# Patient Record
Sex: MALE | Race: WHITE | Employment: FULL TIME | URBAN - METROPOLITAN AREA
[De-identification: names, ages, dates, MRNs, and addresses within clinical notes are randomized per-mention and may not be internally consistent; named-entity substitution may affect disease eponyms.]

---

## 2023-03-24 ENCOUNTER — TELEPHONE (OUTPATIENT)
Dept: PRIMARY CARE | Facility: CLINIC | Age: 48
End: 2023-03-24
Payer: COMMERCIAL

## 2023-03-24 DIAGNOSIS — F98.8 ATTENTION DEFICIT DISORDER (ADD) WITHOUT HYPERACTIVITY: Primary | ICD-10-CM

## 2023-03-24 RX ORDER — NAPROXEN 500 MG/1
1 TABLET ORAL EVERY 12 HOURS PRN
COMMUNITY
Start: 2022-02-16 | End: 2024-05-24 | Stop reason: ALTCHOICE

## 2023-03-24 RX ORDER — DEXTROAMPHETAMINE SACCHARATE, AMPHETAMINE ASPARTATE, DEXTROAMPHETAMINE SULFATE AND AMPHETAMINE SULFATE 7.5; 7.5; 7.5; 7.5 MG/1; MG/1; MG/1; MG/1
1 TABLET ORAL DAILY
COMMUNITY
Start: 2015-02-09 | End: 2023-03-24 | Stop reason: SDUPTHER

## 2023-03-24 RX ORDER — DEXTROAMPHETAMINE SACCHARATE, AMPHETAMINE ASPARTATE, DEXTROAMPHETAMINE SULFATE AND AMPHETAMINE SULFATE 7.5; 7.5; 7.5; 7.5 MG/1; MG/1; MG/1; MG/1
1 TABLET ORAL DAILY
Qty: 90 TABLET | Refills: 0 | Status: SHIPPED | OUTPATIENT
Start: 2023-03-24 | End: 2023-07-17 | Stop reason: SDUPTHER

## 2023-03-24 RX ORDER — CYCLOBENZAPRINE HCL 10 MG
1 TABLET ORAL 2 TIMES DAILY PRN
COMMUNITY
Start: 2022-02-15 | End: 2024-05-24 | Stop reason: ALTCHOICE

## 2023-03-24 RX ORDER — CITALOPRAM 40 MG/1
1 TABLET, FILM COATED ORAL DAILY
COMMUNITY
Start: 2013-11-20 | End: 2023-04-17

## 2023-04-15 DIAGNOSIS — F43.20 ADJUSTMENT DISORDER, UNSPECIFIED TYPE: Primary | ICD-10-CM

## 2023-04-17 RX ORDER — CITALOPRAM 40 MG/1
TABLET, FILM COATED ORAL
Qty: 90 TABLET | Refills: 3 | Status: SHIPPED | OUTPATIENT
Start: 2023-04-17 | End: 2023-09-15 | Stop reason: SDUPTHER

## 2023-07-17 ENCOUNTER — TELEPHONE (OUTPATIENT)
Dept: PRIMARY CARE | Facility: CLINIC | Age: 48
End: 2023-07-17
Payer: COMMERCIAL

## 2023-07-17 DIAGNOSIS — F98.8 ATTENTION DEFICIT DISORDER (ADD) WITHOUT HYPERACTIVITY: ICD-10-CM

## 2023-07-17 PROBLEM — R61 DIAPHORESIS: Status: ACTIVE | Noted: 2023-07-17

## 2023-07-17 PROBLEM — K42.9 UMBILICAL HERNIA: Status: ACTIVE | Noted: 2023-07-17

## 2023-07-17 PROBLEM — U07.1 COVID-19: Status: ACTIVE | Noted: 2023-07-17

## 2023-07-17 PROBLEM — S39.012A LUMBAR STRAIN: Status: ACTIVE | Noted: 2023-07-17

## 2023-07-17 PROBLEM — R79.89 LOW TESTOSTERONE: Status: ACTIVE | Noted: 2023-07-17

## 2023-07-17 PROBLEM — S20.212D RIB CONTUSION, LEFT, SUBSEQUENT ENCOUNTER: Status: ACTIVE | Noted: 2023-07-17

## 2023-07-17 PROBLEM — N28.9 RENAL INSUFFICIENCY: Status: ACTIVE | Noted: 2023-07-17

## 2023-07-17 PROBLEM — R07.89 CHEST DISCOMFORT: Status: ACTIVE | Noted: 2023-07-17

## 2023-07-17 PROBLEM — B34.9 VIRAL INFECTION: Status: ACTIVE | Noted: 2023-07-17
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
## 2023-07-17 PROBLEM — G47.00 INSOMNIA: Status: ACTIVE | Noted: 2023-07-17

## 2023-07-17 PROBLEM — R06.02 SOB (SHORTNESS OF BREATH): Status: ACTIVE | Noted: 2023-07-17

## 2023-07-17 PROBLEM — R05.3 PERSISTENT COUGH: Status: ACTIVE | Noted: 2023-07-17

## 2023-07-17 PROBLEM — R51.9 HEADACHE: Status: ACTIVE | Noted: 2023-07-17

## 2023-07-17 PROBLEM — R79.89 ELEVATED LIVER FUNCTION TESTS: Status: ACTIVE | Noted: 2023-07-17

## 2023-07-17 PROBLEM — F32.A DEPRESSION: Status: ACTIVE | Noted: 2023-07-17

## 2023-07-17 PROBLEM — T14.90XA TRAUMA: Status: ACTIVE | Noted: 2023-07-17

## 2023-07-17 PROBLEM — L73.9 FOLLICULITIS: Status: ACTIVE | Noted: 2023-07-17

## 2023-07-17 PROBLEM — L25.9 CONTACT DERMATITIS: Status: ACTIVE | Noted: 2023-07-17

## 2023-07-17 PROBLEM — R50.9 FEVER: Status: ACTIVE | Noted: 2023-07-17

## 2023-07-17 RX ORDER — DEXTROAMPHETAMINE SACCHARATE, AMPHETAMINE ASPARTATE, DEXTROAMPHETAMINE SULFATE AND AMPHETAMINE SULFATE 7.5; 7.5; 7.5; 7.5 MG/1; MG/1; MG/1; MG/1
1 TABLET ORAL DAILY
Qty: 90 TABLET | Refills: 0 | Status: SHIPPED | OUTPATIENT
Start: 2023-07-17 | End: 2023-08-29 | Stop reason: SDUPTHER

## 2023-08-28 ENCOUNTER — TELEPHONE (OUTPATIENT)
Dept: PRIMARY CARE | Facility: CLINIC | Age: 48
End: 2023-08-28
Payer: COMMERCIAL

## 2023-08-28 DIAGNOSIS — F98.8 ATTENTION DEFICIT DISORDER (ADD) WITHOUT HYPERACTIVITY: ICD-10-CM

## 2023-08-28 RX ORDER — DEXTROAMPHETAMINE SACCHARATE, AMPHETAMINE ASPARTATE, DEXTROAMPHETAMINE SULFATE AND AMPHETAMINE SULFATE 7.5; 7.5; 7.5; 7.5 MG/1; MG/1; MG/1; MG/1
1 TABLET ORAL DAILY
Qty: 30 TABLET | Refills: 0 | Status: CANCELLED | OUTPATIENT
Start: 2023-08-28

## 2023-08-28 RX ORDER — DICLOFENAC SODIUM 50 MG/1
50 TABLET, DELAYED RELEASE ORAL 2 TIMES DAILY
COMMUNITY
Start: 2023-08-13 | End: 2023-08-27

## 2023-08-28 NOTE — TELEPHONE ENCOUNTER
Pt needs a refill he had a request for a 90 day supply insurance only covered 30 day supply. amphetamine-dextroamphetamine (Adderall) 30 mg tablet  x 1 tablet daily please refill for 30 days     [facility] [id number] [facility]

## 2023-08-29 DIAGNOSIS — F98.8 ATTENTION DEFICIT DISORDER (ADD) WITHOUT HYPERACTIVITY: ICD-10-CM

## 2023-08-29 RX ORDER — DEXTROAMPHETAMINE SACCHARATE, AMPHETAMINE ASPARTATE, DEXTROAMPHETAMINE SULFATE AND AMPHETAMINE SULFATE 7.5; 7.5; 7.5; 7.5 MG/1; MG/1; MG/1; MG/1
1 TABLET ORAL DAILY
Qty: 20 TABLET | Refills: 0 | Status: SHIPPED | OUTPATIENT
Start: 2023-08-29 | End: 2023-09-15 | Stop reason: SDUPTHER

## 2023-08-29 NOTE — TELEPHONE ENCOUNTER
Pt scheduled appt. For 9/15.  Pt works in [place] and he comes home every other weekend and is asking if a partial refill can be called into pharmacy to get him by.     [facility] # [phone]  [place] # [phone]

## 2023-09-15 ENCOUNTER — TELEPHONE (OUTPATIENT)
Dept: PRIMARY CARE | Facility: CLINIC | Age: 48
End: 2023-09-15

## 2023-09-15 ENCOUNTER — OFFICE VISIT (OUTPATIENT)
Dept: PRIMARY CARE | Facility: CLINIC | Age: 48
End: 2023-09-15
Payer: COMMERCIAL

## 2023-09-15 VITALS
HEART RATE: 68 BPM | TEMPERATURE: 97.4 F | OXYGEN SATURATION: 97 % | HEIGHT: 72 IN | SYSTOLIC BLOOD PRESSURE: 126 MMHG | BODY MASS INDEX: 28.58 KG/M2 | WEIGHT: 211 LBS | DIASTOLIC BLOOD PRESSURE: 76 MMHG

## 2023-09-15 DIAGNOSIS — Z00.00 HEALTH MAINTENANCE EXAMINATION: ICD-10-CM

## 2023-09-15 DIAGNOSIS — Z79.899 ENCOUNTER FOR LONG-TERM CURRENT USE OF MEDICATION: ICD-10-CM

## 2023-09-15 DIAGNOSIS — F43.20 ADJUSTMENT DISORDER, UNSPECIFIED TYPE: ICD-10-CM

## 2023-09-15 DIAGNOSIS — F98.8 ATTENTION DEFICIT DISORDER (ADD) WITHOUT HYPERACTIVITY: ICD-10-CM

## 2023-09-15 PROCEDURE — 99213 OFFICE O/P EST LOW 20 MIN: CPT | Performed by: FAMILY MEDICINE

## 2023-09-15 PROCEDURE — 80324 DRUG SCREEN AMPHETAMINES 1/2: CPT

## 2023-09-15 PROCEDURE — 80307 DRUG TEST PRSMV CHEM ANLYZR: CPT

## 2023-09-15 RX ORDER — CITALOPRAM 40 MG/1
40 TABLET, FILM COATED ORAL DAILY
Qty: 90 TABLET | Refills: 3 | Status: SHIPPED | OUTPATIENT
Start: 2023-09-15

## 2023-09-15 RX ORDER — DEXTROAMPHETAMINE SACCHARATE, AMPHETAMINE ASPARTATE, DEXTROAMPHETAMINE SULFATE AND AMPHETAMINE SULFATE 7.5; 7.5; 7.5; 7.5 MG/1; MG/1; MG/1; MG/1
1 TABLET ORAL DAILY
Qty: 90 TABLET | Refills: 0 | Status: SHIPPED | OUTPATIENT
Start: 2023-09-15 | End: 2023-12-28 | Stop reason: SDUPTHER

## 2023-09-15 ASSESSMENT — ENCOUNTER SYMPTOMS
DECREASED CONCENTRATION: 0
LOSS OF SENSATION IN FEET: 0
EYES NEGATIVE: 1
CONSTITUTIONAL NEGATIVE: 1
HYPERACTIVE: 0
OCCASIONAL FEELINGS OF UNSTEADINESS: 0
HALLUCINATIONS: 0
RESPIRATORY NEGATIVE: 1
DEPRESSION: 0
PSYCHIATRIC NEGATIVE: 1

## 2023-09-15 ASSESSMENT — PATIENT HEALTH QUESTIONNAIRE - PHQ9
2. FEELING DOWN, DEPRESSED OR HOPELESS: NOT AT ALL
1. LITTLE INTEREST OR PLEASURE IN DOING THINGS: NOT AT ALL
10. IF YOU CHECKED OFF ANY PROBLEMS, HOW DIFFICULT HAVE THESE PROBLEMS MADE IT FOR YOU TO DO YOUR WORK, TAKE CARE OF THINGS AT HOME, OR GET ALONG WITH OTHER PEOPLE: NOT DIFFICULT AT ALL
SUM OF ALL RESPONSES TO PHQ9 QUESTIONS 1 AND 2: 0

## 2023-09-15 NOTE — PROGRESS NOTES
OARRS:  No data recorded  I have personally reviewed the OARRS report for Maksim Siu. I have considered the risks of abuse, dependence, addiction and diversion    Is the patient prescribed a combination of a benzodiazepine and opioid?  No    Last Urine Drug Screen / ordered today: Yes  No results found for this or any previous visit (from the past 8760 hour(s)).  N/A  Clinical rationale for not completing a Urine Drug Screen:  ordered    Controlled Substance Agreement:  Date of the Last Agreement: 9/15/2023  Reviewed Controlled Substance Agreement including but not limited to the benefits, risks, and alternatives to treatment with a Controlled Substance medication(s).    Stimulants:   What is the patient's goal of therapy? Focus well  Is this being achieved with current treatment? yes    Activities of Daily Living:   Is your overall impression that this patient is benefiting (symptom reduction outweighs side effects) from stimulant therapy? Yes     1. Physical Functioning: Better  2. Family Relationship: Same  3. Social Relationship: Same  4. Mood: Better  5. Sleep Patterns: Same  6. Overall Function: Better  Subjective   Patient ID: Maksim Siu is a 48 y.o. male who presents for Med Refill (Adderall).    Patient presents for follow-up.  Doing well with medicine.  He is focusing well is working well.  Has had no side effects no chest pain no shortness of breath no rapid heart rate or slow heart rate.  Skin to come in for physical exam in the next couple of months.  Also needs refill on citalopram.  Has been doing well with this as well.    Med Refill         Review of Systems   Constitutional: Negative.    HENT: Negative.     Eyes: Negative.    Respiratory: Negative.     Genitourinary: Negative.    Psychiatric/Behavioral: Negative.  Negative for decreased concentration and hallucinations. The patient is not hyperactive.        Objective   /76   Pulse 68   Temp 36.3 °C (97.4 °F)   Ht 1.816 m (5'  "11.5\")   Wt 95.7 kg (211 lb)   SpO2 97%   BMI 29.02 kg/m²   BSA Body surface area is 2.2 meters squared.      Physical Exam  HENT:      Head: Normocephalic.      Right Ear: Tympanic membrane normal.      Left Ear: Tympanic membrane normal.      Nose: Nose normal.      Mouth/Throat:      Mouth: Mucous membranes are moist.   Eyes:      Pupils: Pupils are equal, round, and reactive to light.   Cardiovascular:      Pulses: Normal pulses.   Pulmonary:      Effort: Pulmonary effort is normal.   Abdominal:      General: Abdomen is flat.   Musculoskeletal:         General: No deformity.   Neurological:      Mental Status: He is alert.   Psychiatric:         Mood and Affect: Mood normal.         Behavior: Behavior normal.       No visits with results within 1 Year(s) from this visit.   Latest known visit with results is:   Legacy Encounter on 02/04/2021   Component Date Value Ref Range Status    SARS-CoV-2 Result 02/04/2021 NOT DETECTED  Not Detected Final    Comment: .  This assay is designed to detect the N, ORF1ab and/or S genes of SARS-CoV-2   via nucleic acid amplification.  A Negative (NOT DETECTED) result does not   preclude 2019-nCoV infection since the adequacy of sample collection and/or   low viral burden may result in presence of viral nucleic acids below the   clinical sensitivity of this test method.  Negative (NOT DETECTED) result   should not be used as the sole basis for treatment or other patient   management decisions. Rather negative results should be combined with   clinical observations, patient history, and epidemiological information to   make patient management decisions.    Fact sheet for providers: https://www.fda.gov/media/536425/download  Fact sheet for patients: https://www.fda.gov/media/553984/download    This test has received FDA Emergency Use Authorization (EUA) and has been   verified by [facility] ([facility]). This test   is only authorized for the " duration of time that circumstances                            exist to   justify the authorization of the emergency use of in vitro diagnostic tests   for the detection of SARS-CoV-2 virus and/or diagnosis of COVID-19 infection   under section 564(b)(1) of the Act, 21 U.S.C. 360bbb-3(b)(1), unless the   authorization is terminated or revoked sooner.      [facility] is certified under CLIA-88 as   qualified to perform high complexity testing. Testing is performed in the   [facility] laboratories located at [address] [place] [address].      Date of Symptom Onset? (YYYYMMDD)? 02/04/2021 20,210,202   Final     Current Outpatient Medications on File Prior to Visit   Medication Sig Dispense Refill    amphetamine-dextroamphetamine (Adderall) 30 mg tablet Take 1 tablet (30 mg) by mouth once daily. 20 tablet 0    citalopram (CeleXA) 40 mg tablet TAKE ONE TABLET BY MOUTH ONCE DAILY. 90 tablet 3    cyclobenzaprine (Flexeril) 10 mg tablet Take 1 tablet (10 mg) by mouth 2 times a day as needed.      naproxen (Naprosyn) 500 mg tablet Take 1 tablet (500 mg) by mouth every 12 hours if needed.       No current facility-administered medications on file prior to visit.     No images are attached to the encounter.            Assessment/Plan   Problem List Items Addressed This Visit       Attention deficit disorder (ADD) without hyperactivity    Relevant Medications    amphetamine-dextroamphetamine (Adderall) 30 mg tablet    Adjustment disorder    Relevant Medications    citalopram (CeleXA) 40 mg tablet

## 2023-09-15 NOTE — PATIENT INSTRUCTIONS
Overall doing well with present medical regimen.    Please try to take time out to exercise 40 minutes 4 times weekly.    Please try to watch dietary intake and try to eat more healthy.    Please schedule time for the physical given slip for your lab studies today.    Get pain I have personally reviewed the OAARS report for the patient. The report is scanned into the electronic medical record. I have considered the risks of abuse, dependence, addiction, and diversion. I believe that it is clinically appropriate for the patient to be prescribed this medication.    We discussed the increased risks of respiratory depression, including but not limited to death when combining opioid, benzodiazepines, and or sleep aids.

## 2023-09-16 LAB
AMPHETAMINE (PRESENCE) IN URINE BY SCREEN METHOD: ABNORMAL
BARBITURATES PRESENCE IN URINE BY SCREEN METHOD: ABNORMAL
BENZODIAZEPINE (PRESENCE) IN URINE BY SCREEN METHOD: ABNORMAL
CANNABINOIDS IN URINE BY SCREEN METHOD: ABNORMAL
COCAINE (PRESENCE) IN URINE BY SCREEN METHOD: ABNORMAL
DRUG SCREEN COMMENT URINE: ABNORMAL
FENTANYL URINE: ABNORMAL
OPIATES (PRESENCE) IN URINE BY SCREEN METHOD: ABNORMAL
OXYCODONE (PRESENCE) IN URINE BY SCREEN METHOD: ABNORMAL
PHENCYCLIDINE (PRESENCE) IN URINE BY SCREEN METHOD: ABNORMAL

## 2023-09-18 DIAGNOSIS — Z00.00 HEALTH MAINTENANCE EXAMINATION: ICD-10-CM

## 2023-09-18 PROBLEM — N52.9 ED (ERECTILE DYSFUNCTION) OF ORGANIC ORIGIN: Status: ACTIVE | Noted: 2019-01-15

## 2023-09-18 PROBLEM — E29.1 HYPOGONADISM IN MALE: Status: ACTIVE | Noted: 2019-01-15

## 2023-09-18 PROBLEM — E66.9 OBESITY, CLASS II, BMI 35-39.9: Status: ACTIVE | Noted: 2018-09-14

## 2023-09-18 PROBLEM — R68.82 LIBIDO, DECREASED: Status: ACTIVE | Noted: 2019-01-15

## 2023-09-18 PROBLEM — E66.812 OBESITY, CLASS II, BMI 35-39.9: Status: ACTIVE | Noted: 2018-09-14

## 2023-09-18 PROBLEM — B34.9 VIRAL ILLNESS: Status: ACTIVE | Noted: 2018-09-14

## 2023-09-18 RX ORDER — SILDENAFIL 100 MG/1
100 TABLET, FILM COATED ORAL
COMMUNITY
Start: 2019-04-02 | End: 2024-02-02 | Stop reason: SDUPTHER

## 2023-09-18 RX ORDER — IBUPROFEN 600 MG/1
600 TABLET ORAL EVERY 12 HOURS PRN
COMMUNITY
End: 2024-05-24 | Stop reason: ALTCHOICE

## 2023-09-21 LAB
AMPHETAMINES,URINE: 798 NG/ML
MDA,URINE: <200 NG/ML
MDEA,URINE: <200 NG/ML
MDMA,UR: <200 NG/ML
METHAMPHETAMINE QUANTITATIVE URINE: <200 NG/ML
PHENTERMINE,UR: <200 NG/ML

## 2023-11-28 ENCOUNTER — APPOINTMENT (OUTPATIENT)
Dept: PRIMARY CARE | Facility: CLINIC | Age: 48
End: 2023-11-28
Payer: COMMERCIAL

## 2023-11-28 ENCOUNTER — LAB (OUTPATIENT)
Dept: LAB | Facility: LAB | Age: 48
End: 2023-11-28
Payer: COMMERCIAL

## 2023-11-28 DIAGNOSIS — Z00.00 HEALTH MAINTENANCE EXAMINATION: ICD-10-CM

## 2023-11-28 LAB
ALBUMIN SERPL BCP-MCNC: 4.7 G/DL (ref 3.4–5)
ALP SERPL-CCNC: 98 U/L (ref 33–120)
ALT SERPL W P-5'-P-CCNC: 58 U/L (ref 10–52)
ANION GAP SERPL CALC-SCNC: 14 MMOL/L (ref 10–20)
AST SERPL W P-5'-P-CCNC: 34 U/L (ref 9–39)
BASOPHILS # BLD AUTO: 0.1 X10*3/UL (ref 0–0.1)
BASOPHILS NFR BLD AUTO: 1.3 %
BILIRUB SERPL-MCNC: 1 MG/DL (ref 0–1.2)
BUN SERPL-MCNC: 17 MG/DL (ref 6–23)
CALCIUM SERPL-MCNC: 9.8 MG/DL (ref 8.6–10.6)
CHLORIDE SERPL-SCNC: 103 MMOL/L (ref 98–107)
CHOLEST SERPL-MCNC: 287 MG/DL (ref 0–199)
CHOLESTEROL/HDL RATIO: 5.5
CO2 SERPL-SCNC: 27 MMOL/L (ref 21–32)
CREAT SERPL-MCNC: 1.11 MG/DL (ref 0.5–1.3)
EOSINOPHIL # BLD AUTO: 0.3 X10*3/UL (ref 0–0.7)
EOSINOPHIL NFR BLD AUTO: 3.8 %
ERYTHROCYTE [DISTWIDTH] IN BLOOD BY AUTOMATED COUNT: 11.9 % (ref 11.5–14.5)
GFR SERPL CREATININE-BSD FRML MDRD: 81 ML/MIN/1.73M*2
GLUCOSE SERPL-MCNC: 109 MG/DL (ref 74–99)
HCT VFR BLD AUTO: 46.8 % (ref 41–52)
HDLC SERPL-MCNC: 52.1 MG/DL
HGB BLD-MCNC: 15.6 G/DL (ref 13.5–17.5)
IMM GRANULOCYTES # BLD AUTO: 0.02 X10*3/UL (ref 0–0.7)
IMM GRANULOCYTES NFR BLD AUTO: 0.3 % (ref 0–0.9)
LDLC SERPL CALC-MCNC: 183 MG/DL
LYMPHOCYTES # BLD AUTO: 2.41 X10*3/UL (ref 1.2–4.8)
LYMPHOCYTES NFR BLD AUTO: 30.9 %
MCH RBC QN AUTO: 31.5 PG (ref 26–34)
MCHC RBC AUTO-ENTMCNC: 33.3 G/DL (ref 32–36)
MCV RBC AUTO: 95 FL (ref 80–100)
MONOCYTES # BLD AUTO: 0.66 X10*3/UL (ref 0.1–1)
MONOCYTES NFR BLD AUTO: 8.5 %
NEUTROPHILS # BLD AUTO: 4.32 X10*3/UL (ref 1.2–7.7)
NEUTROPHILS NFR BLD AUTO: 55.2 %
NON HDL CHOLESTEROL: 235 MG/DL (ref 0–149)
NRBC BLD-RTO: 0 /100 WBCS (ref 0–0)
PLATELET # BLD AUTO: 239 X10*3/UL (ref 150–450)
POTASSIUM SERPL-SCNC: 4 MMOL/L (ref 3.5–5.3)
PROT SERPL-MCNC: 6.7 G/DL (ref 6.4–8.2)
PSA SERPL-MCNC: 0.92 NG/ML
RBC # BLD AUTO: 4.95 X10*6/UL (ref 4.5–5.9)
SODIUM SERPL-SCNC: 140 MMOL/L (ref 136–145)
T4 FREE SERPL-MCNC: 0.89 NG/DL (ref 0.78–1.48)
TRIGL SERPL-MCNC: 260 MG/DL (ref 0–149)
TSH SERPL-ACNC: 3.99 MIU/L (ref 0.44–3.98)
VLDL: 52 MG/DL (ref 0–40)
WBC # BLD AUTO: 7.8 X10*3/UL (ref 4.4–11.3)

## 2023-11-28 PROCEDURE — 84153 ASSAY OF PSA TOTAL: CPT

## 2023-11-28 PROCEDURE — 84439 ASSAY OF FREE THYROXINE: CPT

## 2023-11-28 PROCEDURE — 80061 LIPID PANEL: CPT

## 2023-11-28 PROCEDURE — 85025 COMPLETE CBC W/AUTO DIFF WBC: CPT

## 2023-11-28 PROCEDURE — 84443 ASSAY THYROID STIM HORMONE: CPT

## 2023-11-28 PROCEDURE — 36415 COLL VENOUS BLD VENIPUNCTURE: CPT

## 2023-11-28 PROCEDURE — 80053 COMPREHEN METABOLIC PANEL: CPT

## 2023-12-19 ENCOUNTER — APPOINTMENT (OUTPATIENT)
Dept: PRIMARY CARE | Facility: CLINIC | Age: 48
End: 2023-12-19
Payer: COMMERCIAL

## 2023-12-28 DIAGNOSIS — F98.8 ATTENTION DEFICIT DISORDER (ADD) WITHOUT HYPERACTIVITY: ICD-10-CM

## 2023-12-28 RX ORDER — DEXTROAMPHETAMINE SACCHARATE, AMPHETAMINE ASPARTATE, DEXTROAMPHETAMINE SULFATE AND AMPHETAMINE SULFATE 7.5; 7.5; 7.5; 7.5 MG/1; MG/1; MG/1; MG/1
1 TABLET ORAL DAILY
Qty: 90 TABLET | Refills: 0 | Status: SHIPPED | OUTPATIENT
Start: 2023-12-28 | End: 2024-04-11 | Stop reason: SDUPTHER

## 2024-02-02 ENCOUNTER — OFFICE VISIT (OUTPATIENT)
Dept: PRIMARY CARE | Facility: CLINIC | Age: 49
End: 2024-02-02
Payer: COMMERCIAL

## 2024-02-02 VITALS
TEMPERATURE: 97.6 F | HEIGHT: 72 IN | SYSTOLIC BLOOD PRESSURE: 128 MMHG | HEART RATE: 88 BPM | DIASTOLIC BLOOD PRESSURE: 80 MMHG | WEIGHT: 270 LBS | OXYGEN SATURATION: 96 % | BODY MASS INDEX: 36.57 KG/M2

## 2024-02-02 DIAGNOSIS — Z12.11 COLON CANCER SCREENING: ICD-10-CM

## 2024-02-02 DIAGNOSIS — F98.8 ATTENTION DEFICIT DISORDER (ADD) WITHOUT HYPERACTIVITY: ICD-10-CM

## 2024-02-02 DIAGNOSIS — E66.9 OBESITY, CLASS II, BMI 35-39.9: ICD-10-CM

## 2024-02-02 DIAGNOSIS — Z00.00 ROUTINE ADULT HEALTH MAINTENANCE: ICD-10-CM

## 2024-02-02 DIAGNOSIS — R79.89 LOW TESTOSTERONE: ICD-10-CM

## 2024-02-02 DIAGNOSIS — E78.5 HYPERLIPIDEMIA, UNSPECIFIED HYPERLIPIDEMIA TYPE: Primary | ICD-10-CM

## 2024-02-02 DIAGNOSIS — F43.20 ADJUSTMENT DISORDER, UNSPECIFIED TYPE: ICD-10-CM

## 2024-02-02 DIAGNOSIS — R61 DIAPHORESIS: ICD-10-CM

## 2024-02-02 PROBLEM — N28.9 RENAL INSUFFICIENCY: Status: RESOLVED | Noted: 2023-07-17 | Resolved: 2024-02-02

## 2024-02-02 PROBLEM — R51.9 HEADACHE: Status: RESOLVED | Noted: 2023-07-17 | Resolved: 2024-02-02

## 2024-02-02 PROBLEM — R68.82 LIBIDO, DECREASED: Status: RESOLVED | Noted: 2019-01-15 | Resolved: 2024-02-02

## 2024-02-02 PROBLEM — R50.9 FEVER: Status: RESOLVED | Noted: 2023-07-17 | Resolved: 2024-02-02

## 2024-02-02 PROBLEM — G47.00 INSOMNIA: Status: RESOLVED | Noted: 2023-07-17 | Resolved: 2024-02-02

## 2024-02-02 PROBLEM — S20.212D RIB CONTUSION, LEFT, SUBSEQUENT ENCOUNTER: Status: RESOLVED | Noted: 2023-07-17 | Resolved: 2024-02-02

## 2024-02-02 PROBLEM — B34.9 VIRAL ILLNESS: Status: RESOLVED | Noted: 2018-09-14 | Resolved: 2024-02-02

## 2024-02-02 PROBLEM — T14.90XA TRAUMA: Status: RESOLVED | Noted: 2023-07-17 | Resolved: 2024-02-02
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
## 2024-02-02 PROBLEM — K42.9 UMBILICAL HERNIA: Status: RESOLVED | Noted: 2023-07-17 | Resolved: 2024-02-02

## 2024-02-02 PROBLEM — L73.9 FOLLICULITIS: Status: RESOLVED | Noted: 2023-07-17 | Resolved: 2024-02-02

## 2024-02-02 PROBLEM — U07.1 COVID-19: Status: RESOLVED | Noted: 2023-07-17 | Resolved: 2024-02-02

## 2024-02-02 PROBLEM — S39.012A LUMBAR STRAIN: Status: RESOLVED | Noted: 2023-07-17 | Resolved: 2024-02-02

## 2024-02-02 PROBLEM — E29.1 HYPOGONADISM IN MALE: Status: RESOLVED | Noted: 2019-01-15 | Resolved: 2024-02-02

## 2024-02-02 PROBLEM — R05.3 PERSISTENT COUGH: Status: RESOLVED | Noted: 2023-07-17 | Resolved: 2024-02-02

## 2024-02-02 PROBLEM — R06.02 SOB (SHORTNESS OF BREATH): Status: RESOLVED | Noted: 2023-07-17 | Resolved: 2024-02-02

## 2024-02-02 PROBLEM — R07.89 CHEST DISCOMFORT: Status: RESOLVED | Noted: 2023-07-17 | Resolved: 2024-02-02

## 2024-02-02 PROCEDURE — 99396 PREV VISIT EST AGE 40-64: CPT | Performed by: FAMILY MEDICINE

## 2024-02-02 PROCEDURE — 1036F TOBACCO NON-USER: CPT | Performed by: FAMILY MEDICINE

## 2024-02-02 RX ORDER — SILDENAFIL 100 MG/1
100 TABLET, FILM COATED ORAL AS NEEDED
Qty: 10 TABLET | Refills: 1 | Status: SHIPPED | OUTPATIENT
Start: 2024-02-02 | End: 2024-05-24 | Stop reason: SDUPTHER

## 2024-02-02 RX ORDER — ROSUVASTATIN CALCIUM 5 MG/1
5 TABLET, COATED ORAL DAILY
Qty: 30 TABLET | Refills: 1 | Status: SHIPPED | OUTPATIENT
Start: 2024-02-02 | End: 2024-05-24 | Stop reason: ALTCHOICE

## 2024-02-02 ASSESSMENT — COLUMBIA-SUICIDE SEVERITY RATING SCALE - C-SSRS
6. HAVE YOU EVER DONE ANYTHING, STARTED TO DO ANYTHING, OR PREPARED TO DO ANYTHING TO END YOUR LIFE?: NO
1. IN THE PAST MONTH, HAVE YOU WISHED YOU WERE DEAD OR WISHED YOU COULD GO TO SLEEP AND NOT WAKE UP?: NO
2. HAVE YOU ACTUALLY HAD ANY THOUGHTS OF KILLING YOURSELF?: NO

## 2024-02-02 ASSESSMENT — ENCOUNTER SYMPTOMS
DIAPHORESIS: 0
FACIAL ASYMMETRY: 0
NUMBNESS: 0
EYE PAIN: 0
CONSTIPATION: 0
EYE ITCHING: 0
EYE DISCHARGE: 0
BACK PAIN: 0
WOUND: 0
SINUS PRESSURE: 0
COUGH: 0
RESPIRATORY NEGATIVE: 1
HEADACHES: 0
APPETITE CHANGE: 0
HEMATURIA: 0
OCCASIONAL FEELINGS OF UNSTEADINESS: 0
NECK PAIN: 0
TREMORS: 0
ARTHRALGIAS: 0
SINUS PAIN: 0
LOSS OF SENSATION IN FEET: 0
DYSURIA: 0
AGITATION: 0
SORE THROAT: 0
CHEST TIGHTNESS: 0
TROUBLE SWALLOWING: 0
VOICE CHANGE: 0
DEPRESSION: 0
ABDOMINAL PAIN: 0
DIZZINESS: 0
ADENOPATHY: 0
DIFFICULTY URINATING: 0
PALPITATIONS: 0
CHILLS: 0
DECREASED CONCENTRATION: 0
POLYDIPSIA: 0
BRUISES/BLEEDS EASILY: 0
NAUSEA: 0
ACTIVITY CHANGE: 0
ANAL BLEEDING: 0
DYSPHORIC MOOD: 0
UNEXPECTED WEIGHT CHANGE: 0
BLOOD IN STOOL: 0
HALLUCINATIONS: 0
FLANK PAIN: 0
FATIGUE: 0
SPEECH DIFFICULTY: 0
SLEEP DISTURBANCE: 0
MYALGIAS: 0
ABDOMINAL DISTENTION: 0
VOMITING: 0
FREQUENCY: 0
SEIZURES: 0
NERVOUS/ANXIOUS: 0
DIARRHEA: 0
CARDIOVASCULAR NEGATIVE: 1
CHOKING: 0
EYE REDNESS: 0
LIGHT-HEADEDNESS: 0

## 2024-02-02 ASSESSMENT — PATIENT HEALTH QUESTIONNAIRE - PHQ9
2. FEELING DOWN, DEPRESSED OR HOPELESS: NOT AT ALL
10. IF YOU CHECKED OFF ANY PROBLEMS, HOW DIFFICULT HAVE THESE PROBLEMS MADE IT FOR YOU TO DO YOUR WORK, TAKE CARE OF THINGS AT HOME, OR GET ALONG WITH OTHER PEOPLE: NOT DIFFICULT AT ALL
SUM OF ALL RESPONSES TO PHQ9 QUESTIONS 1 AND 2: 0
1. LITTLE INTEREST OR PLEASURE IN DOING THINGS: NOT AT ALL

## 2024-02-02 NOTE — PROGRESS NOTES
Subjective   Patient ID: Maksim Siu is a 49 y.o. male who presents for Annual Exam.    Patient presents for physical.  Due to get his colon cancer screening performed.  He has been working a lot.  Cholesterol remains elevated.    Patient had negative workup for right-sided chest pain in the ER.  Has had no further chest pain no shortness of breath no diaphoresis no abdominal pain or discomfort.  He is non-smoker.  No strong family history of coronary disease.  Patient is due to get shingles vaccination.         Alcohol intake: 6 beers a week  Caffeine intake: 1 cup  Exercise: walking alot    Last Colonoscopy: recommended Dr. Toscano  Last Pap smear: N/A  Mammogram:N/A  Last Dexa scan:N/A    Shingles vaccine: reccomended  TdaP vaccine:     Review of Systems   Constitutional:  Negative for activity change, appetite change, chills, diaphoresis, fatigue and unexpected weight change.   HENT:  Negative for congestion, dental problem, ear discharge, ear pain, hearing loss, mouth sores, nosebleeds, postnasal drip, sinus pressure, sinus pain, sore throat, tinnitus, trouble swallowing and voice change.    Eyes:  Negative for pain, discharge, redness, itching and visual disturbance.   Respiratory: Negative.  Negative for cough, choking and chest tightness.    Cardiovascular: Negative.  Negative for chest pain, palpitations and leg swelling.   Gastrointestinal:  Negative for abdominal distention, abdominal pain, anal bleeding, blood in stool, constipation, diarrhea, nausea and vomiting.   Endocrine: Negative for cold intolerance, heat intolerance, polydipsia and polyuria.   Genitourinary:  Negative for difficulty urinating, dysuria, enuresis, flank pain, frequency, hematuria and penile discharge.   Musculoskeletal:  Negative for arthralgias, back pain, gait problem, myalgias and neck pain.   Skin:  Negative for rash and wound.   Allergic/Immunologic: Negative for environmental allergies, food allergies and  immunocompromised state.   Neurological:  Negative for dizziness, tremors, seizures, facial asymmetry, speech difficulty, light-headedness, numbness and headaches.   Hematological:  Negative for adenopathy. Does not bruise/bleed easily.   Psychiatric/Behavioral:  Negative for agitation, behavioral problems, decreased concentration, dysphoric mood, hallucinations, sleep disturbance and suicidal ideas. The patient is not nervous/anxious.    OARRS:  No data recorded  I have personally reviewed the OARRS report for Maksim Siu. I have considered the risks of abuse, dependence, addiction and diversion    Is the patient prescribed a combination of a benzodiazepine and opioid?  Yes, I feel it is clincially indicated to continue the medication and have discussed with the patient risks/benefits/alternatives.    Last Urine Drug Screen / ordered today: No  Recent Results (from the past 8760 hour(s))   Amphetamine Confirm, Urine    Collection Time: 09/15/23 10:49 AM   Result Value Ref Range    Methamphetamine Quant, Ur <200 ng/mL    MDA, Urine <200 ng/mL    MDEA, Urine <200 ng/mL    Phentermine,Urine <200 ng/mL    Amphetamines,Urine 798 ng/mL    MDMA, Urine <200 ng/mL   Drug Screen, Urine With Reflex to Confirmation    Collection Time: 09/15/23 10:49 AM   Result Value Ref Range    DRUG SCREEN COMMENT URINE SEE BELOW     Amphetamine Screen, Urine PRESUMPTIVE POSITIVE (A) NEGATIVE    Barbiturate Screen, Urine PRESUMPTIVE NEGATIVE NEGATIVE    BENZODIAZEPINE (PRESENCE) IN URINE BY SCREEN METHOD PRESUMPTIVE NEGATIVE NEGATIVE    Cannabinoid Screen, Urine PRESUMPTIVE NEGATIVE NEGATIVE    Cocaine Screen, Urine PRESUMPTIVE NEGATIVE NEGATIVE    Fentanyl, Ur PRESUMPTIVE NEGATIVE NEGATIVE    Opiate Screen, Urine PRESUMPTIVE NEGATIVE NEGATIVE    Oxycodone Screen, Ur PRESUMPTIVE NEGATIVE NEGATIVE    PCP Screen, Urine PRESUMPTIVE NEGATIVE NEGATIVE     Results are as expected.         Controlled Substance Agreement:  Date of the Last  "Agreement: 8/2023  Reviewed Controlled Substance Agreement including but not limited to the benefits, risks, and alternatives to treatment with a Controlled Substance medication(s).    Stimulants:   What is the patient's goal of therapy? focus  Is this being achieved with current treatment? yes    Activities of Daily Living:   Is your overall impression that this patient is benefiting (symptom reduction outweighs side effects) from stimulant therapy? Yes     1. Physical Functioning: Better  2. Family Relationship: Same  3. Social Relationship: Same  4. Mood: Same  5. Sleep Patterns: Same  6. Overall Function: Better      Objective   /80   Pulse 88   Temp 36.4 °C (97.6 °F)   Ht 1.816 m (5' 11.5\")   Wt 122 kg (270 lb)   SpO2 96%   BMI 37.13 kg/m²   BSA Body surface area is 2.48 meters squared.      Physical Exam  Constitutional:       General: He is not in acute distress.     Appearance: Normal appearance. He is obese. He is not ill-appearing or toxic-appearing.   HENT:      Head: Normocephalic.      Right Ear: Tympanic membrane normal.      Left Ear: Tympanic membrane normal.      Nose: Nose normal.      Mouth/Throat:      Mouth: Mucous membranes are moist.   Eyes:      Extraocular Movements: Extraocular movements intact.      Conjunctiva/sclera: Conjunctivae normal.      Pupils: Pupils are equal, round, and reactive to light.   Cardiovascular:      Rate and Rhythm: Normal rate and regular rhythm.      Pulses: Normal pulses.      Heart sounds: Normal heart sounds.   Pulmonary:      Effort: Pulmonary effort is normal.      Breath sounds: Normal breath sounds. No wheezing or rhonchi.   Abdominal:      General: Abdomen is flat. Bowel sounds are normal. There is no distension.      Palpations: Abdomen is soft.      Tenderness: There is no abdominal tenderness. There is no right CVA tenderness or rebound.      Hernia: No hernia is present.   Genitourinary:     Penis: Normal.       Testes: Normal.      " Prostate: Normal.   Musculoskeletal:         General: Normal range of motion.      Cervical back: Normal range of motion.      Right lower leg: No edema.   Skin:     General: Skin is warm and dry.      Capillary Refill: Capillary refill takes less than 2 seconds.      Findings: No bruising.   Neurological:      General: No focal deficit present.      Mental Status: He is alert and oriented to person, place, and time.      Cranial Nerves: No cranial nerve deficit.      Sensory: No sensory deficit.      Motor: No weakness.      Coordination: Coordination normal.      Gait: Gait normal.      Deep Tendon Reflexes: Reflexes normal.   Psychiatric:         Mood and Affect: Mood normal.         Behavior: Behavior normal.         Thought Content: Thought content normal.       Lab on 11/28/2023   Component Date Value Ref Range Status    WBC 11/28/2023 7.8  4.4 - 11.3 x10*3/uL Final    nRBC 11/28/2023 0.0  0.0 - 0.0 /100 WBCs Final    RBC 11/28/2023 4.95  4.50 - 5.90 x10*6/uL Final    Hemoglobin 11/28/2023 15.6  13.5 - 17.5 g/dL Final    Hematocrit 11/28/2023 46.8  41.0 - 52.0 % Final    MCV 11/28/2023 95  80 - 100 fL Final    MCH 11/28/2023 31.5  26.0 - 34.0 pg Final    MCHC 11/28/2023 33.3  32.0 - 36.0 g/dL Final    RDW 11/28/2023 11.9  11.5 - 14.5 % Final    Platelets 11/28/2023 239  150 - 450 x10*3/uL Final    Neutrophils % 11/28/2023 55.2  40.0 - 80.0 % Final    Immature Granulocytes %, Automated 11/28/2023 0.3  0.0 - 0.9 % Final    Immature Granulocyte Count (IG) includes promyelocytes, myelocytes and metamyelocytes but does not include bands. Percent differential counts (%) should be interpreted in the context of the absolute cell counts (cells/UL).    Lymphocytes % 11/28/2023 30.9  13.0 - 44.0 % Final    Monocytes % 11/28/2023 8.5  2.0 - 10.0 % Final    Eosinophils % 11/28/2023 3.8  0.0 - 6.0 % Final    Basophils % 11/28/2023 1.3  0.0 - 2.0 % Final    Neutrophils Absolute 11/28/2023 4.32  1.20 - 7.70 x10*3/uL Final     Percent differential counts (%) should be interpreted in the context of the absolute cell counts (cells/uL).    Immature Granulocytes Absolute, Au* 11/28/2023 0.02  0.00 - 0.70 x10*3/uL Final    Lymphocytes Absolute 11/28/2023 2.41  1.20 - 4.80 x10*3/uL Final    Monocytes Absolute 11/28/2023 0.66  0.10 - 1.00 x10*3/uL Final    Eosinophils Absolute 11/28/2023 0.30  0.00 - 0.70 x10*3/uL Final    Basophils Absolute 11/28/2023 0.10  0.00 - 0.10 x10*3/uL Final    Glucose 11/28/2023 109 (H)  74 - 99 mg/dL Final    Sodium 11/28/2023 140  136 - 145 mmol/L Final    Potassium 11/28/2023 4.0  3.5 - 5.3 mmol/L Final    Chloride 11/28/2023 103  98 - 107 mmol/L Final    Bicarbonate 11/28/2023 27  21 - 32 mmol/L Final    Anion Gap 11/28/2023 14  10 - 20 mmol/L Final    Urea Nitrogen 11/28/2023 17  6 - 23 mg/dL Final    Creatinine 11/28/2023 1.11  0.50 - 1.30 mg/dL Final    eGFR 11/28/2023 81  >60 mL/min/1.73m*2 Final    Calculations of estimated GFR are performed using the 2021 CKD-EPI Study Refit equation without the race variable for the IDMS-Traceable creatinine methods.  https://jasn.asnjournals.org/content/early/2021/09/22/ASN.4078532476    Calcium 11/28/2023 9.8  8.6 - 10.6 mg/dL Final    Albumin 11/28/2023 4.7  3.4 - 5.0 g/dL Final    Alkaline Phosphatase 11/28/2023 98  33 - 120 U/L Final    Total Protein 11/28/2023 6.7  6.4 - 8.2 g/dL Final    AST 11/28/2023 34  9 - 39 U/L Final    Bilirubin, Total 11/28/2023 1.0  0.0 - 1.2 mg/dL Final    ALT 11/28/2023 58 (H)  10 - 52 U/L Final    Patients treated with Sulfasalazine may generate falsely decreased results for ALT.    Thyroid Stimulating Hormone 11/28/2023 3.99 (H)  0.44 - 3.98 mIU/L Final    Cholesterol 11/28/2023 287 (H)  0 - 199 mg/dL Final          Age      Desirable   Borderline High   High     0-19 Y     0 - 169       170 - 199     >/= 200    20-24 Y     0 - 189       190 - 224     >/= 225         >24 Y     0 - 199       200 - 239     >/= 240   **All ranges are based  on fasting samples. Specific   therapeutic targets will vary based on patient-specific   cardiac risk.    Pediatric guidelines reference:Pediatrics 2011, 128(S5).Adult guidelines reference: NCEP ATPIII Guidelines,TAM 2001, 258:2486-97    Venipuncture immediately after or during the administration of Metamizole may lead to falsely low results. Testing should be performed immediately prior to Metamizole dosing.    HDL-Cholesterol 11/28/2023 52.1  mg/dL Final      Age       Very Low   Low     Normal    High    0-19 Y    < 35      < 40     40-45     ----  20-24 Y    ----     < 40      >45      ----        >24 Y      ----     < 40     40-60      >60      Cholesterol/HDL Ratio 11/28/2023 5.5   Final      Ref Values  Desirable  < 3.4  High Risk  > 5.0    LDL Calculated 11/28/2023 183 (H)  <=99 mg/dL Final                                Near   Borderline      AGE      Desirable  Optimal    High     High     Very High     0-19 Y     0 - 109     ---    110-129   >/= 130     ----    20-24 Y     0 - 119     ---    120-159   >/= 160     ----      >24 Y     0 -  99   100-129  130-159   160-189     >/=190      VLDL 11/28/2023 52 (H)  0 - 40 mg/dL Final    Triglycerides 11/28/2023 260 (H)  0 - 149 mg/dL Final       Age         Desirable   Borderline High   High     Very High   0 D-90 D    19 - 174         ----         ----        ----  91 D- 9 Y     0 -  74        75 -  99     >/= 100      ----    10-19 Y     0 -  89        90 - 129     >/= 130      ----    20-24 Y     0 - 114       115 - 149     >/= 150      ----         >24 Y     0 - 149       150 - 199    200- 499    >/= 500    Venipuncture immediately after or during the administration of Metamizole may lead to falsely low results. Testing should be performed immediately prior to Metamizole dosing.    Non HDL Cholesterol 11/28/2023 235 (H)  0 - 149 mg/dL Final          Age       Desirable   Borderline High   High     Very High     0-19 Y     0 - 119       120 - 144     >/= 145     >/= 160    20-24 Y     0 - 149       150 - 189     >/= 190      ----         >24 Y    30 mg/dL above LDL Cholesterol goal      Prostate Specific AG 11/28/2023 0.92  <=4.00 ng/mL Final    Thyroxine, Free 11/28/2023 0.89  0.78 - 1.48 ng/dL Final   Office Visit on 09/15/2023   Component Date Value Ref Range Status    DRUG SCREEN COMMENT URINE 09/15/2023 SEE BELOW   Final    Drug screen results are presumptive and should not be used to assess   compliance with prescribed medication. Definitive confirmatory drug testing   has been added to this sample for any positive screen result and will be   reported separately.   .  Toxicology screening results are reported qualitatively. The concentration   must be greater than or equal to the cutoff to be reported as positive. The   concentration at which the screening test can detect an individual drug or   metabolite varies. The absence of expected drug(s) and/or drug metabolite(s)   may indicate non-compliance, inappropriate timing of specimen collection   relative to drug administration, poor drug absorption, diluted/adulterated   urine, or limitations of testing. For medical purposes only; not valid for   forensic use.   .  Interpretive questions should be directed to the laboratory medical   directors.      Amphetamine Screen, Urine 09/15/2023 PRESUMPTIVE POSITIVE (A)  NEGATIVE Final     CUTOFF LEVEL:  500 NG/ML   Cross-reactivity has been reported with high concentrations   of the following drugs: buproprion, chloroquine, chlorpromazine,   ephedrine, mephentermine, fenfluramine, phentermine,   phenylpropanolamine, pseudoephedrine, and propranolol.    Barbiturate Screen, Urine 09/15/2023 PRESUMPTIVE NEGATIVE  NEGATIVE Final     CUTOFF LEVEL: 200 NG/ML    BENZODIAZEPINE (PRESENCE) IN URINE* 09/15/2023 PRESUMPTIVE NEGATIVE  NEGATIVE Final     CUTOFF LEVEL: 200 NG/ML    Cannabinoid Screen, Urine 09/15/2023 PRESUMPTIVE NEGATIVE  NEGATIVE Final     CUTOFF LEVEL: 50 NG/ML     Cocaine Screen, Urine 09/15/2023 PRESUMPTIVE NEGATIVE  NEGATIVE Final     CUTOFF LEVEL: 150 NG/ML    Fentanyl, Ur 09/15/2023 PRESUMPTIVE NEGATIVE  NEGATIVE Final     CUTOFF LEVEL:  5 NG/ML    Opiate Screen, Urine 09/15/2023 PRESUMPTIVE NEGATIVE  NEGATIVE Final     CUTOFF LEVEL: 300 NG/ML   The opiate screen does not detect fentanyl, meperidine, or    tramadol. Oxycodone is not consistently detected (refer to   Oxycodone Screen, Urine result).    Oxycodone Screen, Ur 09/15/2023 PRESUMPTIVE NEGATIVE  NEGATIVE Final     CUTOFF LEVEL: 100 NG/ML    This test will accurately detect both oxycodone and oxymorphone.         PCP Screen, Urine 09/15/2023 PRESUMPTIVE NEGATIVE  NEGATIVE Final     CUTOFF LEVEL:  25 NG/ML   Cross-reactivity has been reported with dextromethorphan.    Methamphetamine Quant, Ur 09/15/2023 <200  ng/mL Final    MDA, Urine 09/15/2023 <200  ng/mL Final    MDEA, Urine 09/15/2023 <200  ng/mL Final    Phentermine,Urine 09/15/2023 <200  ng/mL Final    Performed By: [facility]  [address] [place] [address]  : Viral Hallman MD, PhD  CLIA Number: [id number]    Amphetamines,Urine 09/15/2023 798  ng/mL Final    Comment: Consistent with use of a drug containing amphetamine. May also   reflect metabolism of methamphetamine, when methamphetamine is   present.  Amphetamine and methamphetamine exist in d- and   l-isomeric forms.  These forms are not distinguished by this test.    Isomeric separation is available separately for an additional   charge.  INTERPRETIVE INFORMATION: Amphetamines, Urine,                             Quantitative  Methodology: Quantitative Liquid Chromatography-Tandem Mass   Spectrometry  Positive cutoff: 200 ng/mL unless specified below:  Amphetamine     50 ng/mL  For medical purposes only; not valid for forensic use.   The absence of expected drug(s) and/or drug metabolite(s) may   indicate non-compliance, inappropriate timing of specimen    collection relative to drug administration, poor drug absorption,   diluted/adulterated urine, or limitations of testing. The   concentration value must be greater than or equal to the cutoff to   be reported as positive. Interpretive                            questions should be directed   to the laboratory.  This test was developed and its performance characteristics   determined by [facility]. It has not been cleared or   approved by the US Food and Drug Administration. This test was   performed in a CLIA certified laboratory and is intended for   clinical purposes.    MDMA, Urine 09/15/2023 <200  ng/mL Final     Current Outpatient Medications on File Prior to Visit   Medication Sig Dispense Refill    amphetamine-dextroamphetamine (Adderall) 30 mg tablet Take 1 tablet (30 mg) by mouth once daily. 90 tablet 0    citalopram (CeleXA) 40 mg tablet Take 1 tablet (40 mg) by mouth once daily. 90 tablet 3    sildenafil (Viagra) 100 mg tablet Take 1 tablet (100 mg) by mouth.      cyclobenzaprine (Flexeril) 10 mg tablet Take 1 tablet (10 mg) by mouth 2 times a day as needed.      ibuprofen 600 mg tablet Take 1 tablet (600 mg) by mouth every 12 hours if needed.      naproxen (Naprosyn) 500 mg tablet Take 1 tablet (500 mg) by mouth every 12 hours if needed.       No current facility-administered medications on file prior to visit.     No images are attached to the encounter.            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 The report is scanned into the electronic medical record. I have considered the risks of abuse, dependence, addiction, and diversion. I believe that it is clinically appropriate for the patient to be prescribed this medication.    We discussed the increased risks of respiratory depression, including but not limited to death when  combining opioid, benzodiazepines, and or sleep aids.    On stimulant therapy         RESOLVED: Diaphoresis R61    RESOLVED: Low testosterone R79.89    Relevant Medications    sildenafil (Viagra) 100 mg tablet    Adjustment disorder F43.20     Continue on present medical regimen continue on citalopram.         Obesity, Class II, BMI 35-39.9 E66.9     Please continue to work toward weight loss to 1500-calorie diet try to exercise 40 minutes 4 times weekly         Hyperlipidemia - Primary E78.5     Treating for hyperlipidemia.  Starting rosuvastatin therapy.  Also ordering CT scoring of the coronary arteries         Relevant Medications    rosuvastatin (Crestor) 5 mg tablet

## 2024-02-02 NOTE — ASSESSMENT & PLAN NOTE
I have personally reviewed the OAARS report for the patient. The report is scanned into the electronic medical record. I have considered the risks of abuse, dependence, addiction, and diversion. I believe that it is clinically appropriate for the patient to be prescribed this medication.    We discussed the increased risks of respiratory depression, including but not limited to death when combining opioid, benzodiazepines, and or sleep aids.    On stimulant therapy

## 2024-02-02 NOTE — ASSESSMENT & PLAN NOTE
Please continue to work toward weight loss to 1500-calorie diet try to exercise 40 minutes 4 times weekly

## 2024-02-02 NOTE — LETTER
February 2, 2024     Patient: Maksim Siu   YOB: 1975   Date of Visit: 2/2/2024       To Whom It May Concern:    Maksim Siu was seen in my clinic on 2/2/2024 at 1:20 pm. Please excuse Maksim for his absence from work on this day to make the appointment.  Patient examined for his physical today    If you have any questions or concerns, please don't hesitate to call.         Sincerely,         Jonel Del Real,         CC: No Recipients

## 2024-02-02 NOTE — PATIENT INSTRUCTIONS
Very important to have colon cancer screening performed.  Given information about this today.  Important to have the shingles vaccination done at your convenience.  Given information today.      Because of the elevation of the cholesterol going to have you start lipid-lowering medicine.  If any muscle aches or discomfort while taking the medicine please call and let me know.  We are rechecking labs in 6 weeks including the CMP and lipids.    Because of the elevation of the cholesterol we are going to do CT scoring of the coronary arteries.    Labs have been reviewed with you today medications reviewed and reconciled.

## 2024-02-02 NOTE — ASSESSMENT & PLAN NOTE
Treating for hyperlipidemia.  Starting rosuvastatin therapy.  Also ordering CT scoring of the coronary arteries

## 2024-04-11 ENCOUNTER — TELEPHONE (OUTPATIENT)
Dept: PRIMARY CARE | Facility: CLINIC | Age: 49
End: 2024-04-11
Payer: COMMERCIAL

## 2024-04-11 DIAGNOSIS — F98.8 ATTENTION DEFICIT DISORDER (ADD) WITHOUT HYPERACTIVITY: ICD-10-CM

## 2024-04-11 RX ORDER — DEXTROAMPHETAMINE SACCHARATE, AMPHETAMINE ASPARTATE, DEXTROAMPHETAMINE SULFATE AND AMPHETAMINE SULFATE 7.5; 7.5; 7.5; 7.5 MG/1; MG/1; MG/1; MG/1
1 TABLET ORAL DAILY
Qty: 90 TABLET | Refills: 0 | Status: SHIPPED | OUTPATIENT
Start: 2024-04-11 | End: 2024-05-24 | Stop reason: SDUPTHER

## 2024-04-11 NOTE — TELEPHONE ENCOUNTER
Pt needs a refill on he only has 2 left     amphetamine-dextroamphetamine (Adderall) 30 mg tablet   [id number]   [address], [facility] [address]   Phone: [phone]

## 2024-05-10 ENCOUNTER — HOSPITAL ENCOUNTER (OUTPATIENT)
Dept: RADIOLOGY | Facility: CLINIC | Age: 49
Discharge: HOME | End: 2024-05-10
Payer: COMMERCIAL

## 2024-05-10 DIAGNOSIS — Z00.00 ROUTINE ADULT HEALTH MAINTENANCE: ICD-10-CM

## 2024-05-10 PROCEDURE — 75571 CT HRT W/O DYE W/CA TEST: CPT

## 2024-05-24 ENCOUNTER — OFFICE VISIT (OUTPATIENT)
Dept: PRIMARY CARE | Facility: CLINIC | Age: 49
End: 2024-05-24
Payer: COMMERCIAL

## 2024-05-24 VITALS
WEIGHT: 240 LBS | HEART RATE: 76 BPM | HEIGHT: 72 IN | SYSTOLIC BLOOD PRESSURE: 122 MMHG | OXYGEN SATURATION: 96 % | BODY MASS INDEX: 32.51 KG/M2 | DIASTOLIC BLOOD PRESSURE: 70 MMHG | TEMPERATURE: 96.9 F

## 2024-05-24 DIAGNOSIS — R79.89 LOW TESTOSTERONE: ICD-10-CM

## 2024-05-24 DIAGNOSIS — E78.5 HYPERLIPIDEMIA, UNSPECIFIED HYPERLIPIDEMIA TYPE: Primary | ICD-10-CM

## 2024-05-24 DIAGNOSIS — F43.20 ADJUSTMENT DISORDER, UNSPECIFIED TYPE: ICD-10-CM

## 2024-05-24 DIAGNOSIS — F98.8 ATTENTION DEFICIT DISORDER (ADD) WITHOUT HYPERACTIVITY: ICD-10-CM

## 2024-05-24 DIAGNOSIS — F32.A DEPRESSION, UNSPECIFIED DEPRESSION TYPE: ICD-10-CM

## 2024-05-24 DIAGNOSIS — R79.89 ELEVATED LIVER FUNCTION TESTS: ICD-10-CM

## 2024-05-24 PROBLEM — N52.9 ED (ERECTILE DYSFUNCTION) OF ORGANIC ORIGIN: Status: RESOLVED | Noted: 2019-01-15 | Resolved: 2024-05-24

## 2024-05-24 PROBLEM — L25.9 CONTACT DERMATITIS: Status: RESOLVED | Noted: 2023-07-17 | Resolved: 2024-05-24

## 2024-05-24 PROCEDURE — 99214 OFFICE O/P EST MOD 30 MIN: CPT | Performed by: FAMILY MEDICINE

## 2024-05-24 RX ORDER — DEXTROAMPHETAMINE SACCHARATE, AMPHETAMINE ASPARTATE, DEXTROAMPHETAMINE SULFATE AND AMPHETAMINE SULFATE 7.5; 7.5; 7.5; 7.5 MG/1; MG/1; MG/1; MG/1
1 TABLET ORAL DAILY
Qty: 90 TABLET | Refills: 0 | Status: SHIPPED | OUTPATIENT
Start: 2024-05-24

## 2024-05-24 RX ORDER — SILDENAFIL 100 MG/1
100 TABLET, FILM COATED ORAL AS NEEDED
Qty: 10 TABLET | Refills: 1 | Status: SHIPPED | OUTPATIENT
Start: 2024-05-24

## 2024-05-24 ASSESSMENT — PATIENT HEALTH QUESTIONNAIRE - PHQ9
1. LITTLE INTEREST OR PLEASURE IN DOING THINGS: NOT AT ALL
10. IF YOU CHECKED OFF ANY PROBLEMS, HOW DIFFICULT HAVE THESE PROBLEMS MADE IT FOR YOU TO DO YOUR WORK, TAKE CARE OF THINGS AT HOME, OR GET ALONG WITH OTHER PEOPLE: SOMEWHAT DIFFICULT
2. FEELING DOWN, DEPRESSED OR HOPELESS: SEVERAL DAYS
SUM OF ALL RESPONSES TO PHQ9 QUESTIONS 1 AND 2: 1

## 2024-05-24 ASSESSMENT — ENCOUNTER SYMPTOMS
LOSS OF SENSATION IN FEET: 0
SPEECH DIFFICULTY: 0
CHILLS: 0
APNEA: 0
COUGH: 0
SLEEP DISTURBANCE: 0
OCCASIONAL FEELINGS OF UNSTEADINESS: 0
ADENOPATHY: 0
EYE ITCHING: 0
DEPRESSION: 1
CONSTITUTIONAL NEGATIVE: 1
AGITATION: 1
TREMORS: 0
LIGHT-HEADEDNESS: 0
EYE REDNESS: 0
NERVOUS/ANXIOUS: 0
FACIAL ASYMMETRY: 0
FATIGUE: 0

## 2024-05-24 NOTE — PROGRESS NOTES
Subjective   Patient ID: Maksim Siu is a 49 y.o. male who presents for Follow-up (medications).    Patient presents for follow-up on medications.    Patient with history of hypertension.  Patient with history of ADD.    Patient is doing about steps.  He is lost 30 pounds.  He has had some situations where he is stepson is on the home for tuition for $40,000 and then he dropped out of college.    He is now pain back the lung which is very frustrating for him is because of family discord.  He finds that he has been angry because of it.    He denies any thoughts of suicide or self-harm he will drink beer from time to time.             Review of Systems   Constitutional: Negative.  Negative for chills and fatigue.   HENT: Negative.  Negative for dental problem and ear discharge.    Eyes:  Negative for redness and itching.   Respiratory:  Negative for apnea and cough.    Neurological:  Negative for tremors, facial asymmetry, speech difficulty and light-headedness.   Hematological:  Negative for adenopathy.   Psychiatric/Behavioral:  Positive for agitation. Negative for sleep disturbance and suicidal ideas. The patient is not nervous/anxious.    OARRS:  No data recorded  I have personally reviewed the OARRS report for Maksim Siu. I have considered the risks of abuse, dependence, addiction and diversion    Is the patient prescribed a combination of a benzodiazepine and opioid?  No    Last Urine Drug Screen / ordered today: No  Recent Results (from the past 8760 hour(s))   Amphetamine Confirm, Urine    Collection Time: 09/15/23 10:49 AM   Result Value Ref Range    Methamphetamine Quant, Ur <200 ng/mL    MDA, Urine <200 ng/mL    MDEA, Urine <200 ng/mL    Phentermine,Urine <200 ng/mL    Amphetamines,Urine 798 ng/mL    MDMA, Urine <200 ng/mL   Drug Screen, Urine With Reflex to Confirmation    Collection Time: 09/15/23 10:49 AM   Result Value Ref Range    DRUG SCREEN COMMENT URINE SEE BELOW     Amphetamine Screen,  "Urine PRESUMPTIVE POSITIVE (A) NEGATIVE    Barbiturate Screen, Urine PRESUMPTIVE NEGATIVE NEGATIVE    BENZODIAZEPINE (PRESENCE) IN URINE BY SCREEN METHOD PRESUMPTIVE NEGATIVE NEGATIVE    Cannabinoid Screen, Urine PRESUMPTIVE NEGATIVE NEGATIVE    Cocaine Screen, Urine PRESUMPTIVE NEGATIVE NEGATIVE    Fentanyl, Ur PRESUMPTIVE NEGATIVE NEGATIVE    Opiate Screen, Urine PRESUMPTIVE NEGATIVE NEGATIVE    Oxycodone Screen, Ur PRESUMPTIVE NEGATIVE NEGATIVE    PCP Screen, Urine PRESUMPTIVE NEGATIVE NEGATIVE     Results are as expected.         Controlled Substance Agreement:  Date of the Last Agreement: 9/2023  Reviewed Controlled Substance Agreement including but not limited to the benefits, risks, and alternatives to treatment with a Controlled Substance medication(s).    Stimulants:   What is the patient's goal of therapy? yes  Is this being achieved with current treatment? yes    Activities of Daily Living:   Is your overall impression that this patient is benefiting (symptom reduction outweighs side effects) from stimulant therapy? Yes     1. Physical Functioning: Better  2. Family Relationship: Better  3. Social Relationship: Same  4. Mood: Same  5. Sleep Patterns: Same  6. Overall Function: Better      Objective   /70   Pulse 76   Temp 36.1 °C (96.9 °F)   Ht 1.816 m (5' 11.5\")   Wt 109 kg (240 lb)   SpO2 96%   BMI 33.01 kg/m²   BSA Body surface area is 2.34 meters squared.      Physical Exam  Constitutional:       Appearance: Normal appearance.   HENT:      Head: Normocephalic and atraumatic.      Right Ear: Tympanic membrane normal.      Left Ear: Tympanic membrane normal.   Eyes:      Extraocular Movements: Extraocular movements intact.      Pupils: Pupils are equal, round, and reactive to light.   Cardiovascular:      Rate and Rhythm: Normal rate and regular rhythm.      Pulses: Normal pulses.      Heart sounds: Normal heart sounds.   Pulmonary:      Effort: Pulmonary effort is normal.      Breath " sounds: Normal breath sounds.   Abdominal:      General: Abdomen is flat.   Neurological:      Mental Status: He is alert.       Lab on 11/28/2023   Component Date Value Ref Range Status    WBC 11/28/2023 7.8  4.4 - 11.3 x10*3/uL Final    nRBC 11/28/2023 0.0  0.0 - 0.0 /100 WBCs Final    RBC 11/28/2023 4.95  4.50 - 5.90 x10*6/uL Final    Hemoglobin 11/28/2023 15.6  13.5 - 17.5 g/dL Final    Hematocrit 11/28/2023 46.8  41.0 - 52.0 % Final    MCV 11/28/2023 95  80 - 100 fL Final    MCH 11/28/2023 31.5  26.0 - 34.0 pg Final    MCHC 11/28/2023 33.3  32.0 - 36.0 g/dL Final    RDW 11/28/2023 11.9  11.5 - 14.5 % Final    Platelets 11/28/2023 239  150 - 450 x10*3/uL Final    Neutrophils % 11/28/2023 55.2  40.0 - 80.0 % Final    Immature Granulocytes %, Automated 11/28/2023 0.3  0.0 - 0.9 % Final    Immature Granulocyte Count (IG) includes promyelocytes, myelocytes and metamyelocytes but does not include bands. Percent differential counts (%) should be interpreted in the context of the absolute cell counts (cells/UL).    Lymphocytes % 11/28/2023 30.9  13.0 - 44.0 % Final    Monocytes % 11/28/2023 8.5  2.0 - 10.0 % Final    Eosinophils % 11/28/2023 3.8  0.0 - 6.0 % Final    Basophils % 11/28/2023 1.3  0.0 - 2.0 % Final    Neutrophils Absolute 11/28/2023 4.32  1.20 - 7.70 x10*3/uL Final    Percent differential counts (%) should be interpreted in the context of the absolute cell counts (cells/uL).    Immature Granulocytes Absolute, Au* 11/28/2023 0.02  0.00 - 0.70 x10*3/uL Final    Lymphocytes Absolute 11/28/2023 2.41  1.20 - 4.80 x10*3/uL Final    Monocytes Absolute 11/28/2023 0.66  0.10 - 1.00 x10*3/uL Final    Eosinophils Absolute 11/28/2023 0.30  0.00 - 0.70 x10*3/uL Final    Basophils Absolute 11/28/2023 0.10  0.00 - 0.10 x10*3/uL Final    Glucose 11/28/2023 109 (H)  74 - 99 mg/dL Final    Sodium 11/28/2023 140  136 - 145 mmol/L Final    Potassium 11/28/2023 4.0  3.5 - 5.3 mmol/L Final    Chloride 11/28/2023 103  98 - 107  mmol/L Final    Bicarbonate 11/28/2023 27  21 - 32 mmol/L Final    Anion Gap 11/28/2023 14  10 - 20 mmol/L Final    Urea Nitrogen 11/28/2023 17  6 - 23 mg/dL Final    Creatinine 11/28/2023 1.11  0.50 - 1.30 mg/dL Final    eGFR 11/28/2023 81  >60 mL/min/1.73m*2 Final    Calculations of estimated GFR are performed using the 2021 CKD-EPI Study Refit equation without the race variable for the IDMS-Traceable creatinine methods.  https://jasn.asnjournals.org/content/early/2021/09/22/ASN.0775789358    Calcium 11/28/2023 9.8  8.6 - 10.6 mg/dL Final    Albumin 11/28/2023 4.7  3.4 - 5.0 g/dL Final    Alkaline Phosphatase 11/28/2023 98  33 - 120 U/L Final    Total Protein 11/28/2023 6.7  6.4 - 8.2 g/dL Final    AST 11/28/2023 34  9 - 39 U/L Final    Bilirubin, Total 11/28/2023 1.0  0.0 - 1.2 mg/dL Final    ALT 11/28/2023 58 (H)  10 - 52 U/L Final    Patients treated with Sulfasalazine may generate falsely decreased results for ALT.    Thyroid Stimulating Hormone 11/28/2023 3.99 (H)  0.44 - 3.98 mIU/L Final    Cholesterol 11/28/2023 287 (H)  0 - 199 mg/dL Final          Age      Desirable   Borderline High   High     0-19 Y     0 - 169       170 - 199     >/= 200    20-24 Y     0 - 189       190 - 224     >/= 225         >24 Y     0 - 199       200 - 239     >/= 240   **All ranges are based on fasting samples. Specific   therapeutic targets will vary based on patient-specific   cardiac risk.    Pediatric guidelines reference:Pediatrics 2011, 128(S5).Adult guidelines reference: NCEP ATPIII Guidelines,TAM 2001, 258:2486-97    Venipuncture immediately after or during the administration of Metamizole may lead to falsely low results. Testing should be performed immediately prior to Metamizole dosing.    HDL-Cholesterol 11/28/2023 52.1  mg/dL Final      Age       Very Low   Low     Normal    High    0-19 Y    < 35      < 40     40-45     ----  20-24 Y    ----     < 40      >45      ----        >24 Y      ----     < 40     40-60       >60      Cholesterol/HDL Ratio 11/28/2023 5.5   Final      Ref Values  Desirable  < 3.4  High Risk  > 5.0    LDL Calculated 11/28/2023 183 (H)  <=99 mg/dL Final                                Near   Borderline      AGE      Desirable  Optimal    High     High     Very High     0-19 Y     0 - 109     ---    110-129   >/= 130     ----    20-24 Y     0 - 119     ---    120-159   >/= 160     ----      >24 Y     0 -  99   100-129  130-159   160-189     >/=190      VLDL 11/28/2023 52 (H)  0 - 40 mg/dL Final    Triglycerides 11/28/2023 260 (H)  0 - 149 mg/dL Final       Age         Desirable   Borderline High   High     Very High   0 D-90 D    19 - 174         ----         ----        ----  91 D- 9 Y     0 -  74        75 -  99     >/= 100      ----    10-19 Y     0 -  89        90 - 129     >/= 130      ----    20-24 Y     0 - 114       115 - 149     >/= 150      ----         >24 Y     0 - 149       150 - 199    200- 499    >/= 500    Venipuncture immediately after or during the administration of Metamizole may lead to falsely low results. Testing should be performed immediately prior to Metamizole dosing.    Non HDL Cholesterol 11/28/2023 235 (H)  0 - 149 mg/dL Final          Age       Desirable   Borderline High   High     Very High     0-19 Y     0 - 119       120 - 144     >/= 145    >/= 160    20-24 Y     0 - 149       150 - 189     >/= 190      ----         >24 Y    30 mg/dL above LDL Cholesterol goal      Prostate Specific AG 11/28/2023 0.92  <=4.00 ng/mL Final    Thyroxine, Free 11/28/2023 0.89  0.78 - 1.48 ng/dL Final   Office Visit on 09/15/2023   Component Date Value Ref Range Status    DRUG SCREEN COMMENT URINE 09/15/2023 SEE BELOW   Final    Drug screen results are presumptive and should not be used to assess   compliance with prescribed medication. Definitive confirmatory drug testing   has been added to this sample for any positive screen result and will be   reported separately.   .  Toxicology screening  results are reported qualitatively. The concentration   must be greater than or equal to the cutoff to be reported as positive. The   concentration at which the screening test can detect an individual drug or   metabolite varies. The absence of expected drug(s) and/or drug metabolite(s)   may indicate non-compliance, inappropriate timing of specimen collection   relative to drug administration, poor drug absorption, diluted/adulterated   urine, or limitations of testing. For medical purposes only; not valid for   forensic use.   .  Interpretive questions should be directed to the laboratory medical   directors.      Amphetamine Screen, Urine 09/15/2023 PRESUMPTIVE POSITIVE (A)  NEGATIVE Final     CUTOFF LEVEL:  500 NG/ML   Cross-reactivity has been reported with high concentrations   of the following drugs: buproprion, chloroquine, chlorpromazine,   ephedrine, mephentermine, fenfluramine, phentermine,   phenylpropanolamine, pseudoephedrine, and propranolol.    Barbiturate Screen, Urine 09/15/2023 PRESUMPTIVE NEGATIVE  NEGATIVE Final     CUTOFF LEVEL: 200 NG/ML    BENZODIAZEPINE (PRESENCE) IN URINE* 09/15/2023 PRESUMPTIVE NEGATIVE  NEGATIVE Final     CUTOFF LEVEL: 200 NG/ML    Cannabinoid Screen, Urine 09/15/2023 PRESUMPTIVE NEGATIVE  NEGATIVE Final     CUTOFF LEVEL: 50 NG/ML    Cocaine Screen, Urine 09/15/2023 PRESUMPTIVE NEGATIVE  NEGATIVE Final     CUTOFF LEVEL: 150 NG/ML    Fentanyl, Ur 09/15/2023 PRESUMPTIVE NEGATIVE  NEGATIVE Final     CUTOFF LEVEL:  5 NG/ML    Opiate Screen, Urine 09/15/2023 PRESUMPTIVE NEGATIVE  NEGATIVE Final     CUTOFF LEVEL: 300 NG/ML   The opiate screen does not detect fentanyl, meperidine, or    tramadol. Oxycodone is not consistently detected (refer to   Oxycodone Screen, Urine result).    Oxycodone Screen, Ur 09/15/2023 PRESUMPTIVE NEGATIVE  NEGATIVE Final     CUTOFF LEVEL: 100 NG/ML    This test will accurately detect both oxycodone and oxymorphone.         PCP Screen, Urine  09/15/2023 PRESUMPTIVE NEGATIVE  NEGATIVE Final     CUTOFF LEVEL:  25 NG/ML   Cross-reactivity has been reported with dextromethorphan.    Methamphetamine Quant, Ur 09/15/2023 <200  ng/mL Final    MDA, Urine 09/15/2023 <200  ng/mL Final    MDEA, Urine 09/15/2023 <200  ng/mL Final    Phentermine,Urine 09/15/2023 <200  ng/mL Final    Performed By: [email or web address]  [address] [place] [id number]  : Viral Hallman MD, PhD  CLIA Number: [id number]    Amphetamines,Urine 09/15/2023 798  ng/mL Final    Comment: Consistent with use of a drug containing amphetamine. May also   reflect metabolism of methamphetamine, when methamphetamine is   present.  Amphetamine and methamphetamine exist in d- and   l-isomeric forms.  These forms are not distinguished by this test.    Isomeric separation is available separately for an additional   charge.  INTERPRETIVE INFORMATION: Amphetamines, Urine,                             Quantitative  Methodology: Quantitative Liquid Chromatography-Tandem Mass   Spectrometry  Positive cutoff: 200 ng/mL unless specified below:  Amphetamine     50 ng/mL  For medical purposes only; not valid for forensic use.   The absence of expected drug(s) and/or drug metabolite(s) may   indicate non-compliance, inappropriate timing of specimen   collection relative to drug administration, poor drug absorption,   diluted/adulterated urine, or limitations of testing. The   concentration value must be greater than or equal to the cutoff to   be reported as positive. Interpretive                            questions should be directed   to the laboratory.  This test was developed and its performance characteristics   determined by [email or web address]. It has not been cleared or   approved by the US Food and Drug Administration. This test was   performed in a CLIA certified laboratory and is intended for   clinical purposes.    MDMA, Urine 09/15/2023 <200  ng/mL Final     Current  Outpatient Medications on File Prior to Visit   Medication Sig Dispense Refill    amphetamine-dextroamphetamine (Adderall) 30 mg tablet Take 1 tablet (30 mg) by mouth once daily. 90 tablet 0    citalopram (CeleXA) 40 mg tablet Take 1 tablet (40 mg) by mouth once daily. 90 tablet 3    rosuvastatin (Crestor) 5 mg tablet Take 1 tablet (5 mg) by mouth once daily. 30 tablet 1    sildenafil (Viagra) 100 mg tablet Take 1 tablet (100 mg) by mouth if needed for erectile dysfunction. 10 tablet 1    [DISCONTINUED] cyclobenzaprine (Flexeril) 10 mg tablet Take 1 tablet (10 mg) by mouth 2 times a day as needed.      [DISCONTINUED] ibuprofen 600 mg tablet Take 1 tablet (600 mg) by mouth every 12 hours if needed.      [DISCONTINUED] naproxen (Naprosyn) 500 mg tablet Take 1 tablet (500 mg) by mouth every 12 hours if needed.       No current facility-administered medications on file prior to visit.     No images are attached to the encounter.            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 The report is scanned into the electronic medical record. I have considered the risks of abuse, dependence, addiction, and diversion. I believe that it is clinically appropriate for the patient to be prescribed this medication.    We discussed the increased risks of respiratory depression, including but not limited to death when combining opioid, benzodiazepines, and or sleep aids.  Refilling medicine as noted         Depression F32.A     Difficulties at this time with family discord going to have you reach out with counseling         Elevated liver function tests R79.89     Checking liver function test         Adjustment disorder F43.20    Hyperlipidemia - Primary E78.5     Checking cholesterol levels now that you have been doing a lot of steps          Other Visit Diagnoses          Codes    Low testosterone     R79.89

## 2024-05-24 NOTE — ASSESSMENT & PLAN NOTE
I have personally reviewed the OAARS report for the patient. The report is scanned into the electronic medical record. I have considered the risks of abuse, dependence, addiction, and diversion. I believe that it is clinically appropriate for the patient to be prescribed this medication.    We discussed the increased risks of respiratory depression, including but not limited to death when combining opioid, benzodiazepines, and or sleep aids.  Refilling medicine as noted

## 2024-05-24 NOTE — PATIENT INSTRUCTIONS
I would asked that you meet with counseling regarding this cord at this moment with frustration in the family situation.    Because you have stopped the cholesterol medicine and your weight has dropped significantly with your exercise program go to recheck the lipids and CMP.    Refilling medicine for ADD.    If any troubles with increased anxiety or depression or thoughts of suicide or self-harm, please call day or night.

## 2024-06-14 ENCOUNTER — TELEPHONE (OUTPATIENT)
Dept: PRIMARY CARE | Facility: CLINIC | Age: 49
End: 2024-06-14
Payer: COMMERCIAL

## 2024-06-14 DIAGNOSIS — F43.20 ADJUSTMENT DISORDER, UNSPECIFIED TYPE: ICD-10-CM

## 2024-06-14 RX ORDER — CITALOPRAM 40 MG/1
40 TABLET, FILM COATED ORAL DAILY
Qty: 90 TABLET | Refills: 1 | Status: SHIPPED | OUTPATIENT
Start: 2024-06-14

## 2024-06-14 NOTE — TELEPHONE ENCOUNTER
Pt called in requesting a refill on:    citalopram (CeleXA) 40 mg tablet   Take 1 tablet (40 mg) by mouth once daily.   Quantity: 90 tablet   Pt only has enough for 2 days    [id number] - [place], [place]   Phone: [phone]

## 2024-09-27 ENCOUNTER — OFFICE VISIT (OUTPATIENT)
Dept: PRIMARY CARE | Facility: CLINIC | Age: 49
End: 2024-09-27
Payer: COMMERCIAL

## 2024-09-27 VITALS
HEIGHT: 72 IN | SYSTOLIC BLOOD PRESSURE: 132 MMHG | WEIGHT: 267 LBS | BODY MASS INDEX: 36.16 KG/M2 | TEMPERATURE: 97.1 F | OXYGEN SATURATION: 95 % | HEART RATE: 70 BPM | DIASTOLIC BLOOD PRESSURE: 84 MMHG

## 2024-09-27 DIAGNOSIS — M54.12 CERVICAL RADICULOPATHY: Primary | ICD-10-CM

## 2024-09-27 PROCEDURE — 3008F BODY MASS INDEX DOCD: CPT | Performed by: FAMILY MEDICINE

## 2024-09-27 PROCEDURE — 93000 ELECTROCARDIOGRAM COMPLETE: CPT | Performed by: FAMILY MEDICINE

## 2024-09-27 PROCEDURE — 99214 OFFICE O/P EST MOD 30 MIN: CPT | Performed by: FAMILY MEDICINE

## 2024-09-27 RX ORDER — CYCLOBENZAPRINE HCL 5 MG
5 TABLET ORAL 3 TIMES DAILY PRN
Qty: 30 TABLET | Refills: 0 | Status: SHIPPED | OUTPATIENT
Start: 2024-09-27 | End: 2024-11-26

## 2024-09-27 RX ORDER — METHYLPREDNISOLONE 4 MG/1
TABLET ORAL
Qty: 21 TABLET | Refills: 0 | Status: SHIPPED | OUTPATIENT
Start: 2024-09-27 | End: 2024-10-04

## 2024-09-27 RX ORDER — KETOROLAC TROMETHAMINE 10 MG/1
10 TABLET, FILM COATED ORAL EVERY 6 HOURS PRN
COMMUNITY
Start: 2024-09-23 | End: 2024-09-27 | Stop reason: WASHOUT

## 2024-09-27 ASSESSMENT — ENCOUNTER SYMPTOMS
SINUS PRESSURE: 0
LOSS OF SENSATION IN FEET: 0
BLOOD IN STOOL: 0
ACTIVITY CHANGE: 0
COUGH: 0
PALPITATIONS: 0
DEPRESSION: 1
BACK PAIN: 1
MYALGIAS: 0
FACIAL SWELLING: 0
DIAPHORESIS: 0
CHEST TIGHTNESS: 0
STRIDOR: 0
OCCASIONAL FEELINGS OF UNSTEADINESS: 0
FEVER: 0

## 2024-09-27 ASSESSMENT — PATIENT HEALTH QUESTIONNAIRE - PHQ9
SUM OF ALL RESPONSES TO PHQ9 QUESTIONS 1 AND 2: 0
10. IF YOU CHECKED OFF ANY PROBLEMS, HOW DIFFICULT HAVE THESE PROBLEMS MADE IT FOR YOU TO DO YOUR WORK, TAKE CARE OF THINGS AT HOME, OR GET ALONG WITH OTHER PEOPLE: NOT DIFFICULT AT ALL
2. FEELING DOWN, DEPRESSED OR HOPELESS: NOT AT ALL
1. LITTLE INTEREST OR PLEASURE IN DOING THINGS: NOT AT ALL

## 2024-09-27 NOTE — PATIENT INSTRUCTIONS
Treating for cervical radiculopathy.    Please discontinue use of Toradol.    Starting Medrol Dosepak take as directed.    Also starting muscle relaxant do not drive if this makes you drowsy.  EKG performed and reviewed x-ray of the cervical spine thoracic spine and chest being performed    Expect significant proved in the next 48 hours.  If not improved or worsening of symptoms, please let me know

## 2024-09-27 NOTE — PROGRESS NOTES
"Subjective   Patient ID: Maksim Siu is a 49 y.o. male who presents for Back Pain (upper for over a week).    Last Friday started with back pain. L side started about a week ago noted pain discomfort in the left upper back area.    Patient states there is really no reason for this to happen it is worse when he is up and he is walking around.  He also notices it to be worse if he brings his head forward.  There is been no rash no evidence of zoster pain is not going into the upper shoulder area.    No numbness no tingling into the hand.    Patient has had no troubles with chest pain or shortness of breath no dizziness no lightheadedness.  No troubles with abdominal pain or discomfort.    Patient said no chest pain no shortness of breath.  He was evaluated at a [facility] care clinic.  Was told that he had a muscle pull was started on Toradol.  He has had a little relief with it it probably brought the pain level from a 10 to a 7 or 5.    Patient is able to sleep at night.  He is working tremendous amount of hours at work  .  On feet hurts.             Review of Systems   Constitutional:  Negative for activity change, diaphoresis and fever.   HENT: Negative.  Negative for dental problem, facial swelling and sinus pressure.    Eyes:  Negative for itching.   Respiratory:  Negative for cough, chest tightness and stridor.    Cardiovascular:  Negative for chest pain and palpitations.   Gastrointestinal:  Negative for blood in stool.   Genitourinary:  Negative for penile swelling.   Musculoskeletal:  Positive for back pain and neck pain. Negative for gait problem and myalgias.   Neurological:  Negative for syncope, facial asymmetry and numbness.       Objective   Temp 36.2 °C (97.1 °F)   Ht 1.816 m (5' 11.5\")   Wt 121 kg (267 lb)   BMI 36.72 kg/m²   BSA Body surface area is 2.47 meters squared.      Physical Exam  Constitutional:       General: He is not in acute distress.     Appearance: Normal appearance. He is " ill-appearing and diaphoretic. He is not toxic-appearing.   Cardiovascular:      Rate and Rhythm: Normal rate and regular rhythm.   Pulmonary:      Effort: Pulmonary effort is normal.      Breath sounds: Normal breath sounds.   Abdominal:      General: Abdomen is flat.      Palpations: Abdomen is soft.      Tenderness: There is no abdominal tenderness. There is no guarding.   Neurological:      General: No focal deficit present.      Mental Status: He is alert and oriented to person, place, and time.      Motor: No weakness.      Deep Tendon Reflexes: Reflexes normal.     Patient has some tenderness in the musculature of the lower cervical spine goes along the lateral aspect of the thoracic spine.  Pain is worse with flexing the neck forward some pain with hyperextension rotation was intact    No evidence of zoster.  Strong shoulder shrug noted hands revealed good strength deep tendon reflexes are equal and strong  Lab on 11/28/2023   Component Date Value Ref Range Status    WBC 11/28/2023 7.8  4.4 - 11.3 x10*3/uL Final    nRBC 11/28/2023 0.0  0.0 - 0.0 /100 WBCs Final    RBC 11/28/2023 4.95  4.50 - 5.90 x10*6/uL Final    Hemoglobin 11/28/2023 15.6  13.5 - 17.5 g/dL Final    Hematocrit 11/28/2023 46.8  41.0 - 52.0 % Final    MCV 11/28/2023 95  80 - 100 fL Final    MCH 11/28/2023 31.5  26.0 - 34.0 pg Final    MCHC 11/28/2023 33.3  32.0 - 36.0 g/dL Final    RDW 11/28/2023 11.9  11.5 - 14.5 % Final    Platelets 11/28/2023 239  150 - 450 x10*3/uL Final    Neutrophils % 11/28/2023 55.2  40.0 - 80.0 % Final    Immature Granulocytes %, Automated 11/28/2023 0.3  0.0 - 0.9 % Final    Immature Granulocyte Count (IG) includes promyelocytes, myelocytes and metamyelocytes but does not include bands. Percent differential counts (%) should be interpreted in the context of the absolute cell counts (cells/UL).    Lymphocytes % 11/28/2023 30.9  13.0 - 44.0 % Final    Monocytes % 11/28/2023 8.5  2.0 - 10.0 % Final    Eosinophils %  11/28/2023 3.8  0.0 - 6.0 % Final    Basophils % 11/28/2023 1.3  0.0 - 2.0 % Final    Neutrophils Absolute 11/28/2023 4.32  1.20 - 7.70 x10*3/uL Final    Percent differential counts (%) should be interpreted in the context of the absolute cell counts (cells/uL).    Immature Granulocytes Absolute, Au* 11/28/2023 0.02  0.00 - 0.70 x10*3/uL Final    Lymphocytes Absolute 11/28/2023 2.41  1.20 - 4.80 x10*3/uL Final    Monocytes Absolute 11/28/2023 0.66  0.10 - 1.00 x10*3/uL Final    Eosinophils Absolute 11/28/2023 0.30  0.00 - 0.70 x10*3/uL Final    Basophils Absolute 11/28/2023 0.10  0.00 - 0.10 x10*3/uL Final    Glucose 11/28/2023 109 (H)  74 - 99 mg/dL Final    Sodium 11/28/2023 140  136 - 145 mmol/L Final    Potassium 11/28/2023 4.0  3.5 - 5.3 mmol/L Final    Chloride 11/28/2023 103  98 - 107 mmol/L Final    Bicarbonate 11/28/2023 27  21 - 32 mmol/L Final    Anion Gap 11/28/2023 14  10 - 20 mmol/L Final    Urea Nitrogen 11/28/2023 17  6 - 23 mg/dL Final    Creatinine 11/28/2023 1.11  0.50 - 1.30 mg/dL Final    eGFR 11/28/2023 81  >60 mL/min/1.73m*2 Final    Calculations of estimated GFR are performed using the 2021 CKD-EPI Study Refit equation without the race variable for the IDMS-Traceable creatinine methods.  https://jasn.asnjournals.org/content/early/2021/09/22/ASN.5769420863    Calcium 11/28/2023 9.8  8.6 - 10.6 mg/dL Final    Albumin 11/28/2023 4.7  3.4 - 5.0 g/dL Final    Alkaline Phosphatase 11/28/2023 98  33 - 120 U/L Final    Total Protein 11/28/2023 6.7  6.4 - 8.2 g/dL Final    AST 11/28/2023 34  9 - 39 U/L Final    Bilirubin, Total 11/28/2023 1.0  0.0 - 1.2 mg/dL Final    ALT 11/28/2023 58 (H)  10 - 52 U/L Final    Patients treated with Sulfasalazine may generate falsely decreased results for ALT.    Thyroid Stimulating Hormone 11/28/2023 3.99 (H)  0.44 - 3.98 mIU/L Final    Cholesterol 11/28/2023 287 (H)  0 - 199 mg/dL Final          Age      Desirable   Borderline High   High     0-19 Y     0 - 169        170 - 199     >/= 200    20-24 Y     0 - 189       190 - 224     >/= 225         >24 Y     0 - 199       200 - 239     >/= 240   **All ranges are based on fasting samples. Specific   therapeutic targets will vary based on patient-specific   cardiac risk.    Pediatric guidelines reference:Pediatrics 2011, 128(S5).Adult guidelines reference: NCEP ATPIII Guidelines,TAM 2001, 258:2486-97    Venipuncture immediately after or during the administration of Metamizole may lead to falsely low results. Testing should be performed immediately prior to Metamizole dosing.    HDL-Cholesterol 11/28/2023 52.1  mg/dL Final      Age       Very Low   Low     Normal    High    0-19 Y    < 35      < 40     40-45     ----  20-24 Y    ----     < 40      >45      ----        >24 Y      ----     < 40     40-60      >60      Cholesterol/HDL Ratio 11/28/2023 5.5   Final      Ref Values  Desirable  < 3.4  High Risk  > 5.0    LDL Calculated 11/28/2023 183 (H)  <=99 mg/dL Final                                Near   Borderline      AGE      Desirable  Optimal    High     High     Very High     0-19 Y     0 - 109     ---    110-129   >/= 130     ----    20-24 Y     0 - 119     ---    120-159   >/= 160     ----      >24 Y     0 -  99   100-129  130-159   160-189     >/=190      VLDL 11/28/2023 52 (H)  0 - 40 mg/dL Final    Triglycerides 11/28/2023 260 (H)  0 - 149 mg/dL Final       Age         Desirable   Borderline High   High     Very High   0 D-90 D    19 - 174         ----         ----        ----  91 D- 9 Y     0 -  74        75 -  99     >/= 100      ----    10-19 Y     0 -  89        90 - 129     >/= 130      ----    20-24 Y     0 - 114       115 - 149     >/= 150      ----         >24 Y     0 - 149       150 - 199    200- 499    >/= 500    Venipuncture immediately after or during the administration of Metamizole may lead to falsely low results. Testing should be performed immediately prior to Metamizole dosing.    Non HDL Cholesterol  11/28/2023 235 (H)  0 - 149 mg/dL Final          Age       Desirable   Borderline High   High     Very High     0-19 Y     0 - 119       120 - 144     >/= 145    >/= 160    20-24 Y     0 - 149       150 - 189     >/= 190      ----         >24 Y    30 mg/dL above LDL Cholesterol goal      Prostate Specific AG 11/28/2023 0.92  <=4.00 ng/mL Final    Thyroxine, Free 11/28/2023 0.89  0.78 - 1.48 ng/dL Final     Current Outpatient Medications on File Prior to Visit   Medication Sig Dispense Refill    amphetamine-dextroamphetamine (Adderall) 30 mg tablet Take 1 tablet (30 mg) by mouth once daily. 90 tablet 0    citalopram (CeleXA) 40 mg tablet Take 1 tablet (40 mg) by mouth once daily. 90 tablet 1    ketorolac (Toradol) 10 mg tablet Take 1 tablet (10 mg) by mouth every 6 hours if needed for severe pain (7 - 10).      sildenafil (Viagra) 100 mg tablet Take 1 tablet (100 mg) by mouth if needed for erectile dysfunction. 10 tablet 1     No current facility-administered medications on file prior to visit.     No images are attached to the encounter.            Assessment/Plan   Problem List Items Addressed This Visit    None  Visit Diagnoses         Codes    Cervical radiculopathy    -  Primary M54.12    Relevant Medications    methylPREDNISolone (Medrol Dospak) 4 mg tablets    cyclobenzaprine (Flexeril) 5 mg tablet    Other Relevant Orders    XR chest 2 views    XR cervical spine complete 4-5 views    XR thoracic spine 3 views    ECG 12 Lead (Completed)

## 2024-09-28 ASSESSMENT — ENCOUNTER SYMPTOMS
NUMBNESS: 0
EYE ITCHING: 0
NECK PAIN: 1
FACIAL ASYMMETRY: 0

## 2024-10-03 ENCOUNTER — TELEPHONE (OUTPATIENT)
Dept: PRIMARY CARE | Facility: CLINIC | Age: 49
End: 2024-10-03
Payer: COMMERCIAL

## 2024-10-03 DIAGNOSIS — M54.12 CERVICAL RADICULOPATHY: Primary | ICD-10-CM

## 2024-10-03 DIAGNOSIS — M54.12 CERVICAL RADICULOPATHY: ICD-10-CM

## 2024-10-03 RX ORDER — PREDNISONE 5 MG/1
TABLET ORAL
Qty: 78 TABLET | Refills: 0 | Status: SHIPPED | OUTPATIENT
Start: 2024-10-03

## 2024-10-03 NOTE — TELEPHONE ENCOUNTER
Pt made aware. He gave me the name & numbers for a PT place in [place]. [facility]; F: [phone], phone # [phone] & he said that he is already scheduled. I will fax the order to them.

## 2024-10-29 ENCOUNTER — TELEPHONE (OUTPATIENT)
Dept: PRIMARY CARE | Facility: CLINIC | Age: 49
End: 2024-10-29
Payer: COMMERCIAL

## 2024-10-29 DIAGNOSIS — F98.8 ATTENTION DEFICIT DISORDER (ADD) WITHOUT HYPERACTIVITY: ICD-10-CM

## 2024-10-29 RX ORDER — DEXTROAMPHETAMINE SACCHARATE, AMPHETAMINE ASPARTATE, DEXTROAMPHETAMINE SULFATE AND AMPHETAMINE SULFATE 7.5; 7.5; 7.5; 7.5 MG/1; MG/1; MG/1; MG/1
1 TABLET ORAL DAILY
Qty: 90 TABLET | Refills: 0 | Status: SHIPPED | OUTPATIENT
Start: 2024-10-29

## 2025-01-28 ENCOUNTER — TELEPHONE (OUTPATIENT)
Dept: PRIMARY CARE | Facility: CLINIC | Age: 50
End: 2025-01-28
Payer: COMMERCIAL

## 2025-01-28 ENCOUNTER — TELEMEDICINE (OUTPATIENT)
Dept: PRIMARY CARE | Facility: CLINIC | Age: 50
End: 2025-01-28
Payer: COMMERCIAL

## 2025-01-28 DIAGNOSIS — J01.90 ACUTE SINUSITIS, RECURRENCE NOT SPECIFIED, UNSPECIFIED LOCATION: Primary | ICD-10-CM

## 2025-01-28 PROCEDURE — 99214 OFFICE O/P EST MOD 30 MIN: CPT | Performed by: NURSE PRACTITIONER

## 2025-01-28 RX ORDER — AMOXICILLIN AND CLAVULANATE POTASSIUM 875; 125 MG/1; MG/1
875 TABLET, FILM COATED ORAL 2 TIMES DAILY
Qty: 14 TABLET | Refills: 0 | Status: SHIPPED | OUTPATIENT
Start: 2025-01-28 | End: 2025-01-28 | Stop reason: ALTCHOICE

## 2025-01-28 RX ORDER — AZITHROMYCIN 250 MG/1
TABLET, FILM COATED ORAL
Qty: 6 TABLET | Refills: 0 | Status: SHIPPED | OUTPATIENT
Start: 2025-01-28 | End: 2025-02-02

## 2025-01-28 NOTE — TELEPHONE ENCOUNTER
Pt called stated he is working in [facility] and having sinus issues for a couple of weeks and unable to come in for an appointment asking for a virtual visit or if he could get a z-pack sent to     [facility]/pharmacy #[id number] - [place], [place] - [address] AT [facility]  Phone: [phone]   Fax: [phone]   Please advise   Pt phone: [phone]

## 2025-01-28 NOTE — PROGRESS NOTES
Subjective   Patient ID: Maksim Siu is a 50 y.o. male who presents for Sinusitis. I performed this visit using real-time telehealth tools, including an audio/video connection between Maksim Siu and myself, Wendi Medrano CNP, within the state of [place].  Consent has been obtained for this visit. I have verbally confirmed with Maksim Siu (or parent if under 18) that they are physically located in the [facility] during this virtual visit. Telemedicine appropriate evaluation completed.     HPI     Patient reports 1 month history of cold symptoms. He reports sinus and nasal congestion, headaches, earaches, and occasional cough. He has been taking Mucinex daytime and nighttime most days and Afrin off and on. He denies fevers, chills, body aches, sore throat, chest pain, wheezing, shortness of breath, nausea, vomiting, or diarrhea.     Review of Systems  ROS negative except as noted above in HPI.     Objective   There were no vitals taken for this visit.    Physical Exam  A full physical exam was unable to be completed due to the limitations of the telehealth visit, but those observed are noted below.     Constitutional: Alert and in no acute distress  Pulmonary: No respiratory distress  Neurologic: Normal cortical function  Psychiatric: Normal judgment and insight. Normal mood and affect     Assessment/Plan   Diagnoses and all orders for this visit:  Acute sinusitis, recurrence not specified, unspecified location  -     amoxicillin-pot clavulanate (Augmentin) 875-125 mg tablet; Take 1 tablet (875 mg) by mouth 2 times a day for 7 days.  -     Symptomatic support        -     Rest, fluids    FU if symptoms persist or worsen    BANDAR Butler-CNP  [facility]

## 2025-02-13 DIAGNOSIS — M54.12 CERVICAL RADICULOPATHY: ICD-10-CM

## 2025-02-13 DIAGNOSIS — F98.8 ATTENTION DEFICIT DISORDER (ADD) WITHOUT HYPERACTIVITY: ICD-10-CM

## 2025-02-14 RX ORDER — DEXTROAMPHETAMINE SACCHARATE, AMPHETAMINE ASPARTATE, DEXTROAMPHETAMINE SULFATE AND AMPHETAMINE SULFATE 7.5; 7.5; 7.5; 7.5 MG/1; MG/1; MG/1; MG/1
1 TABLET ORAL DAILY
Qty: 90 TABLET | Refills: 0 | Status: SHIPPED | OUTPATIENT
Start: 2025-02-14

## 2025-05-30 ENCOUNTER — TELEPHONE (OUTPATIENT)
Dept: PRIMARY CARE | Facility: CLINIC | Age: 50
End: 2025-05-30
Payer: COMMERCIAL

## 2025-05-30 DIAGNOSIS — F98.8 ATTENTION DEFICIT DISORDER (ADD) WITHOUT HYPERACTIVITY: ICD-10-CM

## 2025-05-30 RX ORDER — DEXTROAMPHETAMINE SACCHARATE, AMPHETAMINE ASPARTATE, DEXTROAMPHETAMINE SULFATE AND AMPHETAMINE SULFATE 7.5; 7.5; 7.5; 7.5 MG/1; MG/1; MG/1; MG/1
1 TABLET ORAL DAILY
Qty: 90 TABLET | Refills: 0 | Status: SHIPPED | OUTPATIENT
Start: 2025-05-30

## 2025-05-30 NOTE — TELEPHONE ENCOUNTER
[facility] [phone]    Adderall 30mg tablet  Take 1 tablet PO PIYUSH   Pt scheduled for [facility] med refill appt on 6/2/25

## 2025-06-02 ENCOUNTER — APPOINTMENT (OUTPATIENT)
Dept: PRIMARY CARE | Facility: CLINIC | Age: 50
End: 2025-06-02
Payer: COMMERCIAL

## 2025-06-05 DIAGNOSIS — F98.8 ATTENTION DEFICIT DISORDER (ADD) WITHOUT HYPERACTIVITY: ICD-10-CM

## 2025-06-06 DIAGNOSIS — F98.8 ATTENTION DEFICIT DISORDER (ADD) WITHOUT HYPERACTIVITY: ICD-10-CM

## 2025-06-09 RX ORDER — DEXTROAMPHETAMINE SACCHARATE, AMPHETAMINE ASPARTATE, DEXTROAMPHETAMINE SULFATE AND AMPHETAMINE SULFATE 7.5; 7.5; 7.5; 7.5 MG/1; MG/1; MG/1; MG/1
1 TABLET ORAL DAILY
Qty: 90 TABLET | Refills: 0 | Status: SHIPPED | OUTPATIENT
Start: 2025-06-09 | End: 2025-06-10 | Stop reason: SDUPTHER

## 2025-06-10 ENCOUNTER — APPOINTMENT (OUTPATIENT)
Dept: GENERAL RADIOLOGY | Age: 50
End: 2025-06-10
Payer: COMMERCIAL

## 2025-06-10 ENCOUNTER — HOSPITAL ENCOUNTER (EMERGENCY)
Age: 50
Discharge: HOME | End: 2025-06-10
Attending: EMERGENCY MEDICINE
Payer: COMMERCIAL

## 2025-06-10 ENCOUNTER — APPOINTMENT (OUTPATIENT)
Dept: PRIMARY CARE | Facility: CLINIC | Age: 50
End: 2025-06-10
Payer: COMMERCIAL

## 2025-06-10 VITALS
OXYGEN SATURATION: 100 % | DIASTOLIC BLOOD PRESSURE: 98 MMHG | SYSTOLIC BLOOD PRESSURE: 147 MMHG | RESPIRATION RATE: 16 BRPM | TEMPERATURE: 99.1 F | HEIGHT: 72 IN | HEART RATE: 69 BPM | WEIGHT: 281.97 LBS | BODY MASS INDEX: 38.19 KG/M2

## 2025-06-10 DIAGNOSIS — L03.039: Primary | ICD-10-CM

## 2025-06-10 DIAGNOSIS — L03.90 CELLULITIS, UNSPECIFIED CELLULITIS SITE: ICD-10-CM

## 2025-06-10 DIAGNOSIS — M79.675 PAIN OF TOE OF LEFT FOOT: Primary | ICD-10-CM

## 2025-06-10 DIAGNOSIS — F98.8 ATTENTION DEFICIT DISORDER (ADD) WITHOUT HYPERACTIVITY: ICD-10-CM

## 2025-06-10 PROCEDURE — 99283 EMERGENCY DEPT VISIT LOW MDM: CPT

## 2025-06-10 PROCEDURE — 73660 X-RAY EXAM OF TOE(S): CPT

## 2025-06-10 PROCEDURE — 1036F TOBACCO NON-USER: CPT | Performed by: FAMILY MEDICINE

## 2025-06-10 PROCEDURE — 99213 OFFICE O/P EST LOW 20 MIN: CPT | Performed by: FAMILY MEDICINE

## 2025-06-10 RX ORDER — CEPHALEXIN 500 MG/1
1 TABLET, FILM COATED ORAL
COMMUNITY
Start: 2025-06-06 | End: 2025-06-10 | Stop reason: SDUPTHER

## 2025-06-10 RX ORDER — DEXTROAMPHETAMINE SACCHARATE, AMPHETAMINE ASPARTATE, DEXTROAMPHETAMINE SULFATE AND AMPHETAMINE SULFATE 7.5; 7.5; 7.5; 7.5 MG/1; MG/1; MG/1; MG/1
1 TABLET ORAL DAILY
Qty: 90 TABLET | Refills: 0 | Status: SHIPPED | OUTPATIENT
Start: 2025-06-10

## 2025-06-10 RX ORDER — CEPHALEXIN 500 MG/1
500 TABLET, FILM COATED ORAL 2 TIMES DAILY
Qty: 10 TABLET | Refills: 0 | Status: SHIPPED | OUTPATIENT
Start: 2025-06-10

## 2025-06-10 ASSESSMENT — PAIN DESCRIPTION - LOCATION: LOCATION: TOE (COMMENT WHICH ONE)

## 2025-06-10 ASSESSMENT — PAIN DESCRIPTION - ORIENTATION: ORIENTATION: LEFT

## 2025-06-10 ASSESSMENT — ENCOUNTER SYMPTOMS
LOSS OF SENSATION IN FEET: 1
WOUND: 1
APNEA: 0
DEPRESSION: 0
EYE ITCHING: 0
DECREASED CONCENTRATION: 0
COLOR CHANGE: 1
APPETITE CHANGE: 0
BACK PAIN: 0
SHORTNESS OF BREATH: 0
NERVOUS/ANXIOUS: 0
DIAPHORESIS: 0
HYPERACTIVE: 0
OCCASIONAL FEELINGS OF UNSTEADINESS: 0

## 2025-06-10 ASSESSMENT — PAIN DESCRIPTION - PAIN TYPE: TYPE: ACUTE PAIN

## 2025-06-10 ASSESSMENT — PATIENT HEALTH QUESTIONNAIRE - PHQ9
1. LITTLE INTEREST OR PLEASURE IN DOING THINGS: NOT AT ALL
SUM OF ALL RESPONSES TO PHQ9 QUESTIONS 1 AND 2: 0
2. FEELING DOWN, DEPRESSED OR HOPELESS: NOT AT ALL

## 2025-06-10 ASSESSMENT — PAIN - FUNCTIONAL ASSESSMENT: PAIN_FUNCTIONAL_ASSESSMENT: 0-10

## 2025-06-10 ASSESSMENT — PAIN SCALES - GENERAL: PAINLEVEL_OUTOF10: 5

## 2025-06-10 NOTE — PROGRESS NOTES
Subjective   Patient ID: Maksim Siu is a 50 y.o. male who presents for Med Refill (Adderall ).    Patient presents for follow-up patient needs Adderall to be refilled.  Patient is traveling on a work assignment is in [facility].    Is doing well with the medicine is helping with focus and he is performing well.  He said no side effects no chest pain or shortness of breath no troubles with anxiety or depression.    Patient also was recently treated for a possible cellulitis of his toe is uncertain if he traumatized the toe or not.    Toe has been somewhat painful some redness was noted.    Had a little bit of warmth as well as legs in therapy reviewed running out as next couple of days.  he does not notice much change with this toe    Med Refill  Pertinent negatives include no chest pain, congestion or diaphoresis.    Doing wellOARRS:  No data recorded  I have personally reviewed the OARRS report for Maksim Siu. I have considered the risks of abuse, dependence, addiction and diversion    Is the patient prescribed a combination of a benzodiazepine and opioid?  No    Last Urine Drug Screen / ordered today: No  No results found for this or any previous visit (from the past 8760 hours).  Results are as expected.     Clinical rationale for not completing a Urine Drug Screen: done anually      Controlled Substance Agreement:  Date of the Last Agreement: previously done  Reviewed Controlled Substance Agreement including but not limited to the benefits, risks, and alternatives to treatment with a Controlled Substance medication(s).    Stimulants:   What is the patient's goal of therapy? Mor focused  Is this being achieved with current treatment? yes    Activities of Daily Living:   Is your overall impression that this patient is benefiting (symptom reduction outweighs side effects) from stimulant therapy? Yes     1. Physical Functioning: Same  2. Family Relationship: Same  3. Social Relationship: Better  4.  Mood: Better refilling medication as noted we will follow-up this summer when you are back in town  5. Sleep Patterns: Same  6. Overall Function: Better      Review of Systems   Constitutional:  Negative for appetite change and diaphoresis.   HENT:  Negative for congestion.    Eyes:  Negative for itching.   Respiratory:  Negative for apnea and shortness of breath.    Cardiovascular:  Negative for chest pain.   Musculoskeletal:  Negative for back pain.   Skin:  Positive for color change and wound.   Psychiatric/Behavioral:  Negative for decreased concentration. The patient is not nervous/anxious and is not hyperactive.         Focusing well       Objective   There were no vitals taken for this visit.  BSA There is no height or weight on file to calculate BSA.      Physical Exam  Constitutional:       Appearance: Normal appearance.   Skin:     Coloration: Skin is not jaundiced.      Comments: Some bruising some erythema noted of the toe   Neurological:      Mental Status: He is alert.       No visits with results within 1 Year(s) from this visit.   Latest known visit with results is:   Lab on 11/28/2023   Component Date Value Ref Range Status    WBC 11/28/2023 7.8  4.4 - 11.3 x10*3/uL Final    nRBC 11/28/2023 0.0  0.0 - 0.0 /100 WBCs Final    RBC 11/28/2023 4.95  4.50 - 5.90 x10*6/uL Final    Hemoglobin 11/28/2023 15.6  13.5 - 17.5 g/dL Final    Hematocrit 11/28/2023 46.8  41.0 - 52.0 % Final    MCV 11/28/2023 95  80 - 100 fL Final    MCH 11/28/2023 31.5  26.0 - 34.0 pg Final    MCHC 11/28/2023 33.3  32.0 - 36.0 g/dL Final    RDW 11/28/2023 11.9  11.5 - 14.5 % Final    Platelets 11/28/2023 239  150 - 450 x10*3/uL Final    Neutrophils % 11/28/2023 55.2  40.0 - 80.0 % Final    Immature Granulocytes %, Automated 11/28/2023 0.3  0.0 - 0.9 % Final    Immature Granulocyte Count (IG) includes promyelocytes, myelocytes and metamyelocytes but does not include bands. Percent differential counts (%) should be interpreted in the  context of the absolute cell counts (cells/UL).    Lymphocytes % 11/28/2023 30.9  13.0 - 44.0 % Final    Monocytes % 11/28/2023 8.5  2.0 - 10.0 % Final    Eosinophils % 11/28/2023 3.8  0.0 - 6.0 % Final    Basophils % 11/28/2023 1.3  0.0 - 2.0 % Final    Neutrophils Absolute 11/28/2023 4.32  1.20 - 7.70 x10*3/uL Final    Percent differential counts (%) should be interpreted in the context of the absolute cell counts (cells/uL).    Immature Granulocytes Absolute, Au* 11/28/2023 0.02  0.00 - 0.70 x10*3/uL Final    Lymphocytes Absolute 11/28/2023 2.41  1.20 - 4.80 x10*3/uL Final    Monocytes Absolute 11/28/2023 0.66  0.10 - 1.00 x10*3/uL Final    Eosinophils Absolute 11/28/2023 0.30  0.00 - 0.70 x10*3/uL Final    Basophils Absolute 11/28/2023 0.10  0.00 - 0.10 x10*3/uL Final    Glucose 11/28/2023 109 (H)  74 - 99 mg/dL Final    Sodium 11/28/2023 140  136 - 145 mmol/L Final    Potassium 11/28/2023 4.0  3.5 - 5.3 mmol/L Final    Chloride 11/28/2023 103  98 - 107 mmol/L Final    Bicarbonate 11/28/2023 27  21 - 32 mmol/L Final    Anion Gap 11/28/2023 14  10 - 20 mmol/L Final    Urea Nitrogen 11/28/2023 17  6 - 23 mg/dL Final    Creatinine 11/28/2023 1.11  0.50 - 1.30 mg/dL Final    eGFR 11/28/2023 81  >60 mL/min/1.73m*2 Final    Calculations of estimated GFR are performed using the 2021 CKD-EPI Study Refit equation without the race variable for the IDMS-Traceable creatinine methods.  https://jasn.asnjournals.org/content/early/2021/09/22/ASN.7124676886    Calcium 11/28/2023 9.8  8.6 - 10.6 mg/dL Final    Albumin 11/28/2023 4.7  3.4 - 5.0 g/dL Final    Alkaline Phosphatase 11/28/2023 98  33 - 120 U/L Final    Total Protein 11/28/2023 6.7  6.4 - 8.2 g/dL Final    AST 11/28/2023 34  9 - 39 U/L Final    Bilirubin, Total 11/28/2023 1.0  0.0 - 1.2 mg/dL Final    ALT 11/28/2023 58 (H)  10 - 52 U/L Final    Patients treated with Sulfasalazine may generate falsely decreased results for ALT.    Thyroid Stimulating Hormone 11/28/2023  3.99 (H)  0.44 - 3.98 mIU/L Final    Cholesterol 11/28/2023 287 (H)  0 - 199 mg/dL Final          Age      Desirable   Borderline High   High     0-19 Y     0 - 169       170 - 199     >/= 200    20-24 Y     0 - 189       190 - 224     >/= 225         >24 Y     0 - 199       200 - 239     >/= 240   **All ranges are based on fasting samples. Specific   therapeutic targets will vary based on patient-specific   cardiac risk.    Pediatric guidelines reference:Pediatrics 2011, 128(S5).Adult guidelines reference: NCEP ATPIII Guidelines,TAM 2001, 258:2386-20    Venipuncture immediately after or during the administration of Metamizole may lead to falsely low results. Testing should be performed immediately prior to Metamizole dosing.    HDL-Cholesterol 11/28/2023 52.1  mg/dL Final      Age       Very Low   Low     Normal    High    0-19 Y    < 35      < 40     40-45     ----  20-24 Y    ----     < 40      >45      ----        >24 Y      ----     < 40     40-60      >60      Cholesterol/HDL Ratio 11/28/2023 5.5   Final      Ref Values  Desirable  < 3.4  High Risk  > 5.0    LDL Calculated 11/28/2023 183 (H)  <=99 mg/dL Final                                Near   Borderline      AGE      Desirable  Optimal    High     High     Very High     0-19 Y     0 - 109     ---    110-129   >/= 130     ----    20-24 Y     0 - 119     ---    120-159   >/= 160     ----      >24 Y     0 -  99   100-129  130-159   160-189     >/=190      VLDL 11/28/2023 52 (H)  0 - 40 mg/dL Final    Triglycerides 11/28/2023 260 (H)  0 - 149 mg/dL Final       Age         Desirable   Borderline High   High     Very High   0 D-90 D    19 - 174         ----         ----        ----  91 D- 9 Y     0 -  74        75 -  99     >/= 100      ----    10-19 Y     0 -  89        90 - 129     >/= 130      ----    20-24 Y     0 - 114       115 - 149     >/= 150      ----         >24 Y     0 - 149       150 - 199    200- 499    >/= 500    Venipuncture immediately after or  during the administration of Metamizole may lead to falsely low results. Testing should be performed immediately prior to Metamizole dosing.    Non HDL Cholesterol 11/28/2023 235 (H)  0 - 149 mg/dL Final          Age       Desirable   Borderline High   High     Very High     0-19 Y     0 - 119       120 - 144     >/= 145    >/= 160    20-24 Y     0 - 149       150 - 189     >/= 190      ----         >24 Y    30 mg/dL above LDL Cholesterol goal      Prostate Specific AG 11/28/2023 0.92  <=4.00 ng/mL Final    Thyroxine, Free 11/28/2023 0.89  0.78 - 1.48 ng/dL Final     Medications Ordered Prior to Encounter[1]  No images are attached to the encounter.            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 The report is scanned into the electronic medical record. I have considered the risks of abuse, dependence, addiction, and diversion. I believe that it is clinically appropriate for the patient to be prescribed this medication.    We discussed the increased risks of respiratory depression, including but not limited to death when combining opioid, benzodiazepines, and or sleep aids.         Cellulitis of middle toe - Primary L03.039    Advised patient to go to urgent care for evaluation may need x-ray to ensure that this is not osteomyelitis or to rule out gout                    [1]   Current Outpatient Medications on File Prior to Visit   Medication Sig Dispense Refill    amphetamine-dextroamphetamine (Adderall) 30 mg tablet Take 1 tablet (30 mg) by mouth once daily. 90 tablet 0    cephalexin (Keftab) 500 mg tablet Take 1 tablet (500 mg) by mouth 3 times a day.      citalopram (CeleXA) 40 mg tablet Take 1 tablet (40 mg) by mouth once daily. 90 tablet 1    sildenafil (Viagra) 100 mg tablet Take 1 tablet (100 mg) by mouth if needed for erectile dysfunction.  10 tablet 1    predniSONE (Deltasone) 5 mg tablet Take 12 tabs (60 mg) by mouth on day 1, then 11 tabs (55 mg) by mouth on day 2. Continue to decrease by 1 tab (5 mg)  every day until gone (12 days). 78 tablet 0    [DISCONTINUED] amphetamine-dextroamphetamine (Adderall) 30 mg tablet Take 1 tablet (30 mg) by mouth once daily. 90 tablet 0     No current facility-administered medications on file prior to visit.

## 2025-06-10 NOTE — DISCHARGE INSTRUCTIONS
Follow-up with podiatry for further evaluation of your toe pain.  Return to the ER with any worsening symptoms or new concerns

## 2025-06-10 NOTE — ED PROVIDER NOTES
oz), SpO2 100%.    Patient was given the following medications. I counseled patient how to take these medications.   New Prescriptions    No medications on file       Follow Up / Referral (if applicable):  Matti Sibley, HEATHER  [address] [address]  [phone]            I, Lang Reynolds, am the primary attending of record and contributed the majority of evaluation and treatment of emergent care for this encounter.     This chart was generated in part by using Dragon Dictation system and may contain errors related to that system including errors in grammar, punctuation, and spelling, as well as words and phrases that may be inappropriate. If there are any questions or concerns please feel free to contact the dictating provider for clarification.     Lang Reynolds MD   [facility]       Lang Reynolds MD  06/10/25 4211

## 2025-06-10 NOTE — PATIENT INSTRUCTIONS
Refilling medication as noted will follow-up with you when you are back in town this summer.    I did refill the cephalexin for the toe/infection.  I recommend going to urgent care today or tonight for further evaluation may need x-ray of the toe to make sure that there is no troubles with the bone or circulation or gout.  I am unable to ascertain well what is going on with a virtual visit and it is important to see someone in person

## 2025-06-10 NOTE — ASSESSMENT & PLAN NOTE
Advised patient to go to urgent care for evaluation may need x-ray to ensure that this is not osteomyelitis or to rule out gout

## 2025-06-10 NOTE — ASSESSMENT & PLAN NOTE
I have personally reviewed the [facility] report for the patient. The report is scanned into the electronic medical record. I have considered the risks of abuse, dependence, addiction, and diversion. I believe that it is clinically appropriate for the patient to be prescribed this medication.    We discussed the increased risks of respiratory depression, including but not limited to death when combining opioid, benzodiazepines, and or sleep aids.

## 2025-07-11 ENCOUNTER — TELEPHONE (OUTPATIENT)
Dept: PRIMARY CARE | Facility: CLINIC | Age: 50
End: 2025-07-11
Payer: COMMERCIAL

## 2025-07-11 DIAGNOSIS — F98.8 ATTENTION DEFICIT DISORDER (ADD) WITHOUT HYPERACTIVITY: ICD-10-CM

## 2025-07-11 DIAGNOSIS — Z79.899 ENCOUNTER FOR LONG-TERM CURRENT USE OF MEDICATION: ICD-10-CM

## 2025-07-11 RX ORDER — DEXTROAMPHETAMINE SACCHARATE, AMPHETAMINE ASPARTATE, DEXTROAMPHETAMINE SULFATE AND AMPHETAMINE SULFATE 7.5; 7.5; 7.5; 7.5 MG/1; MG/1; MG/1; MG/1
1 TABLET ORAL DAILY
Qty: 90 TABLET | Refills: 0 | Status: SHIPPED | OUTPATIENT
Start: 2025-07-11

## 2025-07-11 NOTE — TELEPHONE ENCOUNTER
Pt called requesting a refill for medication pt stated he has 2 pills left    amphetamine-dextroamphetamine (Adderall) 30 mg tablet    Take 1 tablet (30 mg) by mouth once daily.   Quantity: 90 tablet     [facility] [employer], [place] - [address] [place] [address] [address]   Phone: [phone]   Fax: [phone]

## 2025-07-19 LAB
AMPHET UR-MCNC: 1164 NG/ML
AMPHET UR-MCNC: 1360 NG/ML
AMPHETAMINES UR QL: POSITIVE NG/ML
BARBITURATES UR QL: NEGATIVE NG/ML
BENZODIAZ UR QL: NEGATIVE NG/ML
BZE UR QL: NEGATIVE NG/ML
CREAT UR-MCNC: 177.6 MG/DL
DRUG SCREEN COMMENT UR-IMP: ABNORMAL
FENTANYL UR QL SCN: NEGATIVE NG/ML
MDA UR-MCNC: NEGATIVE NG/ML
MDEA UR-MCNC: NEGATIVE NG/ML
MDMA UR-MCNC: NEGATIVE NG/ML
METHADONE UR QL: NEGATIVE NG/ML
METHAMPHET UR-MCNC: NEGATIVE NG/ML
METHAMPHET UR-MCNC: NEGATIVE NG/ML
OPIATES UR QL: NEGATIVE NG/ML
OXIDANTS UR QL: NEGATIVE MCG/ML
OXYCODONE UR QL: NEGATIVE NG/ML
PCP UR QL: NEGATIVE NG/ML
PH UR: 6.8 [PH] (ref 4.5–9)
PHENTERMINE UR-MCNC: NEGATIVE NG/ML
QUEST NOTES AND COMMENTS: ABNORMAL
THC UR QL: NEGATIVE NG/ML